# Patient Record
Sex: FEMALE | Race: BLACK OR AFRICAN AMERICAN | HISPANIC OR LATINO | Employment: FULL TIME | URBAN - METROPOLITAN AREA
[De-identification: names, ages, dates, MRNs, and addresses within clinical notes are randomized per-mention and may not be internally consistent; named-entity substitution may affect disease eponyms.]

---

## 2021-09-30 ENCOUNTER — OFFICE VISIT (OUTPATIENT)
Dept: FAMILY MEDICINE CLINIC | Facility: OTHER | Age: 36
End: 2021-09-30
Payer: COMMERCIAL

## 2021-09-30 VITALS
HEART RATE: 85 BPM | TEMPERATURE: 98 F | BODY MASS INDEX: 35.29 KG/M2 | DIASTOLIC BLOOD PRESSURE: 78 MMHG | SYSTOLIC BLOOD PRESSURE: 112 MMHG | HEIGHT: 66 IN | RESPIRATION RATE: 18 BRPM | WEIGHT: 219.6 LBS | OXYGEN SATURATION: 98 %

## 2021-09-30 DIAGNOSIS — Z11.4 SCREENING FOR HIV (HUMAN IMMUNODEFICIENCY VIRUS): ICD-10-CM

## 2021-09-30 DIAGNOSIS — Z11.59 NEED FOR HEPATITIS C SCREENING TEST: ICD-10-CM

## 2021-09-30 DIAGNOSIS — Z13.29 SCREENING FOR THYROID DISORDER: ICD-10-CM

## 2021-09-30 DIAGNOSIS — Z13.220 SCREENING FOR LIPID DISORDERS: ICD-10-CM

## 2021-09-30 PROCEDURE — 99203 OFFICE O/P NEW LOW 30 MIN: CPT | Performed by: FAMILY MEDICINE

## 2021-09-30 NOTE — PROGRESS NOTES
Assessment/Plan:    Patient presents to establish care  Discussed breast cancer screening at length as she has a sister that was diagnosed in her early 35s  No current complaints or signs/symptoms noted today  Reviewed BMI >30 and discussed importance of lifestyle modifications given increased risk for developing chronic conditions including HTN, Type DM II and HLD  RTO in 4 weeks for Papsmear  Diagnoses and all orders for this visit:    BMI 35 0-35 9,adult  -     CBC and differential; Future  -     Comprehensive metabolic panel; Future  -     Lipid panel; Future  -     Hemoglobin A1C; Future  -     Hepatitis C antibody; Future    Screening for thyroid disorder  -     T4, free; Future  -     TSH, 3rd generation; Future    Screening for lipid disorders  -     Lipid panel; Future    Screening for HIV (human immunodeficiency virus)  -     HIV 1/2 Antigen/Antibody (4th Generation) w Reflex [facility]; Future    Need for hepatitis C screening test  -     Hepatitis C antibody; Future      Nutrition Assessment and Intervention:     Ordered nutritional assessment labs      Physical Activity Assessment and Intervention:    Activity journal reviewed    Physical Activity Prescription completed with patient      Other interventions: Patient has agreed to start exercising 1 hour/week ( ie going on a walk) with partner     Tobacco and Toxic Substance Assessment and Intervention:     Tobacco use screening performed    Alcohol and drug use screening performed      Therapeutic Lifestyle Change Visit:     One-on-one comprehensive counseling, coaching, and health behavior change visit completed        Subjective:      Patient ID: Vanessa Eid is a 43 y o  female        Patient presents to establish care at this time  She would like to discuss breast cancer screening as her sister was diagnosed with breast cancer in her early 35s about 5-6 years ago  Patient reports she is unsure if sister did BRCA 1 and 2 testing, and she did call sister during visit to clarify  However, the sister does not recall this testing being done  Patient also reports that due to above family hx she got mammography done at the age of 25 in [facility] which was unremarkable for malignancy  Discussed with patient at length regarding Breast Cancer screening recommendations and it would be appropriate to beginning screening at the age of 34 given her family hx and preference for more aggressive screening  All questions and concerns answered at this time        BMI Counseling: Body mass index is 35 44 kg/m²  The BMI is above normal  Nutrition recommendations include 3-5 servings of fruits/vegetables daily, consuming healthier snacks and decreasing soda and/or juice intake  Exercise recommendations include moderate aerobic physical activity for 150 minutes/week and exercising 3-5 times per week  The following portions of the patient's history were reviewed and updated as appropriate: allergies, current medications, past family history, past medical history, past social history, past surgical history and problem list     Review of Systems   Constitutional: Negative for chills and fever  HENT: Negative for hearing loss  Eyes: Negative for visual disturbance  Respiratory: Negative for chest tightness and shortness of breath  Cardiovascular: Negative for chest pain and palpitations  Gastrointestinal: Negative for nausea and vomiting  Genitourinary: Negative for difficulty urinating and dysuria  Musculoskeletal: Negative for gait problem  Skin: Negative for rash  Neurological: Negative for dizziness, syncope, light-headedness and headaches  Objective:      /78   Pulse 85   Temp 98 °F (36 7 °C)   Resp 18   Ht 5' 6" (1 676 m)   Wt 99 6 kg (219 lb 9 6 oz)   SpO2 98%   BMI 35 44 kg/m²          Physical Exam  Vitals reviewed  Constitutional:       Appearance: She is well-developed  HENT:      Head: Normocephalic and atraumatic  Nose: Nose normal    Eyes:      Conjunctiva/sclera: Conjunctivae normal    Cardiovascular:      Rate and Rhythm: Normal rate and regular rhythm  Heart sounds: Normal heart sounds  Pulmonary:      Effort: Pulmonary effort is normal       Breath sounds: Normal breath sounds  Abdominal:      General: Bowel sounds are normal       Palpations: Abdomen is soft  Musculoskeletal:         General: Normal range of motion  Cervical back: Normal range of motion and neck supple  Skin:     General: Skin is warm and dry  Neurological:      Mental Status: She is alert and oriented to person, place, and time

## 2021-11-08 ENCOUNTER — APPOINTMENT (OUTPATIENT)
Dept: LAB | Facility: CLINIC | Age: 36
End: 2021-11-08
Payer: COMMERCIAL

## 2021-11-08 DIAGNOSIS — Z13.29 SCREENING FOR THYROID DISORDER: ICD-10-CM

## 2021-11-08 DIAGNOSIS — Z13.220 SCREENING FOR LIPID DISORDERS: ICD-10-CM

## 2021-11-08 DIAGNOSIS — Z11.4 SCREENING FOR HIV (HUMAN IMMUNODEFICIENCY VIRUS): ICD-10-CM

## 2021-11-08 DIAGNOSIS — Z11.59 NEED FOR HEPATITIS C SCREENING TEST: ICD-10-CM

## 2021-11-08 LAB
ALBUMIN SERPL BCP-MCNC: 4.2 G/DL (ref 3.5–5)
ALP SERPL-CCNC: 56 U/L (ref 46–116)
ALT SERPL W P-5'-P-CCNC: 29 U/L (ref 12–78)
ANION GAP SERPL CALCULATED.3IONS-SCNC: 11 MMOL/L (ref 4–13)
AST SERPL W P-5'-P-CCNC: 17 U/L (ref 5–45)
BASOPHILS # BLD AUTO: 0.03 THOUSANDS/ΜL (ref 0–0.1)
BASOPHILS NFR BLD AUTO: 0 % (ref 0–1)
BILIRUB SERPL-MCNC: 0.42 MG/DL (ref 0.2–1)
BUN SERPL-MCNC: 13 MG/DL (ref 5–25)
CALCIUM SERPL-MCNC: 8.9 MG/DL (ref 8.3–10.1)
CHLORIDE SERPL-SCNC: 103 MMOL/L (ref 100–108)
CHOLEST SERPL-MCNC: 165 MG/DL (ref 50–200)
CO2 SERPL-SCNC: 26 MMOL/L (ref 21–32)
CREAT SERPL-MCNC: 0.85 MG/DL (ref 0.6–1.3)
EOSINOPHIL # BLD AUTO: 0.09 THOUSAND/ΜL (ref 0–0.61)
EOSINOPHIL NFR BLD AUTO: 1 % (ref 0–6)
ERYTHROCYTE [DISTWIDTH] IN BLOOD BY AUTOMATED COUNT: 13.7 % (ref 11.6–15.1)
EST. AVERAGE GLUCOSE BLD GHB EST-MCNC: 105 MG/DL
GFR SERPL CREATININE-BSD FRML MDRD: 101 ML/MIN/1.73SQ M
GLUCOSE P FAST SERPL-MCNC: 97 MG/DL (ref 65–99)
HBA1C MFR BLD: 5.3 %
HCT VFR BLD AUTO: 39 % (ref 34.8–46.1)
HCV AB SER QL: NORMAL
HDLC SERPL-MCNC: 47 MG/DL
HGB BLD-MCNC: 12.7 G/DL (ref 11.5–15.4)
IMM GRANULOCYTES # BLD AUTO: 0.03 THOUSAND/UL (ref 0–0.2)
IMM GRANULOCYTES NFR BLD AUTO: 0 % (ref 0–2)
LDLC SERPL CALC-MCNC: 107 MG/DL (ref 0–100)
LYMPHOCYTES # BLD AUTO: 2.18 THOUSANDS/ΜL (ref 0.6–4.47)
LYMPHOCYTES NFR BLD AUTO: 28 % (ref 14–44)
MCH RBC QN AUTO: 25 PG (ref 26.8–34.3)
MCHC RBC AUTO-ENTMCNC: 32.6 G/DL (ref 31.4–37.4)
MCV RBC AUTO: 77 FL (ref 82–98)
MONOCYTES # BLD AUTO: 0.58 THOUSAND/ΜL (ref 0.17–1.22)
MONOCYTES NFR BLD AUTO: 7 % (ref 4–12)
NEUTROPHILS # BLD AUTO: 5.01 THOUSANDS/ΜL (ref 1.85–7.62)
NEUTS SEG NFR BLD AUTO: 64 % (ref 43–75)
NONHDLC SERPL-MCNC: 118 MG/DL
NRBC BLD AUTO-RTO: 0 /100 WBCS
PLATELET # BLD AUTO: 333 THOUSANDS/UL (ref 149–390)
PMV BLD AUTO: 11.1 FL (ref 8.9–12.7)
POTASSIUM SERPL-SCNC: 3.3 MMOL/L (ref 3.5–5.3)
PROT SERPL-MCNC: 8 G/DL (ref 6.4–8.2)
RBC # BLD AUTO: 5.08 MILLION/UL (ref 3.81–5.12)
SODIUM SERPL-SCNC: 140 MMOL/L (ref 136–145)
T4 FREE SERPL-MCNC: 1.17 NG/DL (ref 0.76–1.46)
TRIGL SERPL-MCNC: 57 MG/DL
TSH SERPL DL<=0.05 MIU/L-ACNC: 4.44 UIU/ML (ref 0.36–3.74)
WBC # BLD AUTO: 7.92 THOUSAND/UL (ref 4.31–10.16)

## 2021-11-08 PROCEDURE — 36415 COLL VENOUS BLD VENIPUNCTURE: CPT

## 2021-11-08 PROCEDURE — 83036 HEMOGLOBIN GLYCOSYLATED A1C: CPT

## 2021-11-08 PROCEDURE — 86803 HEPATITIS C AB TEST: CPT

## 2021-11-08 PROCEDURE — 80061 LIPID PANEL: CPT

## 2021-11-08 PROCEDURE — 85025 COMPLETE CBC W/AUTO DIFF WBC: CPT

## 2021-11-08 PROCEDURE — 87389 HIV-1 AG W/HIV-1&-2 AB AG IA: CPT

## 2021-11-08 PROCEDURE — 84443 ASSAY THYROID STIM HORMONE: CPT

## 2021-11-08 PROCEDURE — 84439 ASSAY OF FREE THYROXINE: CPT

## 2021-11-08 PROCEDURE — 80053 COMPREHEN METABOLIC PANEL: CPT

## 2021-11-09 LAB — HIV 1+2 AB+HIV1 P24 AG SERPL QL IA: NORMAL

## 2021-11-11 ENCOUNTER — ANNUAL EXAM (OUTPATIENT)
Dept: FAMILY MEDICINE CLINIC | Facility: OTHER | Age: 36
End: 2021-11-11
Payer: COMMERCIAL

## 2021-11-11 VITALS
OXYGEN SATURATION: 98 % | RESPIRATION RATE: 18 BRPM | HEIGHT: 66 IN | HEART RATE: 102 BPM | DIASTOLIC BLOOD PRESSURE: 78 MMHG | BODY MASS INDEX: 35.81 KG/M2 | TEMPERATURE: 97.8 F | WEIGHT: 222.8 LBS | SYSTOLIC BLOOD PRESSURE: 110 MMHG

## 2021-11-11 DIAGNOSIS — N63.20 LEFT BREAST LUMP: ICD-10-CM

## 2021-11-11 DIAGNOSIS — R79.89 ELEVATED TSH: ICD-10-CM

## 2021-11-11 DIAGNOSIS — Z12.4 CERVICAL CANCER SCREENING: Primary | ICD-10-CM

## 2021-11-11 PROCEDURE — G0145 SCR C/V CYTO,THINLAYER,RESCR: HCPCS | Performed by: FAMILY MEDICINE

## 2021-11-11 PROCEDURE — S0612 ANNUAL GYNECOLOGICAL EXAMINA: HCPCS | Performed by: FAMILY MEDICINE

## 2021-11-11 PROCEDURE — 3008F BODY MASS INDEX DOCD: CPT | Performed by: FAMILY MEDICINE

## 2021-11-11 PROCEDURE — 1036F TOBACCO NON-USER: CPT | Performed by: FAMILY MEDICINE

## 2021-11-11 PROCEDURE — 99213 OFFICE O/P EST LOW 20 MIN: CPT | Performed by: FAMILY MEDICINE

## 2021-11-18 LAB
LAB AP GYN PRIMARY INTERPRETATION: NORMAL
Lab: NORMAL

## 2021-11-23 ENCOUNTER — TELEPHONE (OUTPATIENT)
Dept: FAMILY MEDICINE CLINIC | Facility: OTHER | Age: 36
End: 2021-11-23

## 2022-02-04 ENCOUNTER — HOSPITAL ENCOUNTER (OUTPATIENT)
Dept: MAMMOGRAPHY | Facility: CLINIC | Age: 37
Discharge: HOME | End: 2022-02-04
Payer: COMMERCIAL

## 2022-02-04 ENCOUNTER — HOSPITAL ENCOUNTER (OUTPATIENT)
Dept: ULTRASOUND IMAGING | Facility: CLINIC | Age: 37
Discharge: HOME | End: 2022-02-04
Payer: COMMERCIAL

## 2022-02-04 VITALS — BODY MASS INDEX: 35.68 KG/M2 | HEIGHT: 66 IN | WEIGHT: 222 LBS

## 2022-02-04 DIAGNOSIS — N63.24 BREAST LUMP ON LEFT SIDE AT 8 O'CLOCK POSITION: ICD-10-CM

## 2022-02-04 PROCEDURE — 77066 DX MAMMO INCL CAD BI: CPT

## 2022-02-04 PROCEDURE — G0279 TOMOSYNTHESIS, MAMMO: HCPCS

## 2022-02-04 PROCEDURE — 76642 ULTRASOUND BREAST LIMITED: CPT

## 2023-08-24 ENCOUNTER — OFFICE VISIT (OUTPATIENT)
Dept: FAMILY MEDICINE CLINIC | Facility: OTHER | Age: 38
End: 2023-08-24
Payer: COMMERCIAL

## 2023-08-24 VITALS
TEMPERATURE: 98.2 F | HEART RATE: 74 BPM | WEIGHT: 218 LBS | BODY MASS INDEX: 35.03 KG/M2 | HEIGHT: 66 IN | RESPIRATION RATE: 13 BRPM | OXYGEN SATURATION: 98 % | SYSTOLIC BLOOD PRESSURE: 112 MMHG | DIASTOLIC BLOOD PRESSURE: 72 MMHG

## 2023-08-24 DIAGNOSIS — Z12.31 ENCOUNTER FOR SCREENING MAMMOGRAM FOR MALIGNANT NEOPLASM OF BREAST: ICD-10-CM

## 2023-08-24 DIAGNOSIS — Z13.220 SCREENING FOR LIPID DISORDERS: ICD-10-CM

## 2023-08-24 DIAGNOSIS — Z13.1 SCREENING FOR DIABETES MELLITUS: ICD-10-CM

## 2023-08-24 DIAGNOSIS — Z00.00 ANNUAL PHYSICAL EXAM: Primary | ICD-10-CM

## 2023-08-24 PROCEDURE — 99396 PREV VISIT EST AGE 40-64: CPT

## 2023-08-24 NOTE — PROGRESS NOTES
ADULT ANNUAL [facility]    NAME: Cande Montiel  AGE: 34 y.o. SEX: female  : 1985     DATE: 2023     Assessment and Plan:     Problem List Items Addressed This Visit    None  Visit Diagnoses     Annual physical exam    -  Primary    Encounter for screening mammogram for malignant neoplasm of breast        Relevant Orders    Mammo screening bilateral w 3d & cad    Screening for diabetes mellitus        Relevant Orders    Comprehensive metabolic panel    Screening for lipid disorders        Relevant Orders    Lipid Panel with Direct LDL reflex    BMI 35.0-35.9,adult              Immunizations and preventive care screenings were discussed with patient today. Appropriate education was printed on patient's after visit summary. Counseling:  Alcohol/drug use: discussed moderation in alcohol intake, the recommendations for healthy alcohol use, and avoidance of illicit drug use. Dental Health: discussed importance of regular tooth brushing, flossing, and dental visits. Injury prevention: discussed safety/seat belts, safety helmets, smoke detectors, carbon dioxide detectors, and smoking near bedding or upholstery. Sexual health: discussed sexually transmitted diseases, partner selection, use of condoms, avoidance of unintended pregnancy, and contraceptive alternatives. · Exercise: the importance of regular exercise/physical activity was discussed. Recommend exercise 3-5 times per week for at least 30 minutes. BMI Counseling: Body mass index is 35.19 kg/m². The BMI is above normal. Nutrition recommendations include encouraging healthy choices of fruits and vegetables, moderation in carbohydrate intake, increasing intake of lean protein and reducing intake of saturated and trans fat. Exercise recommendations include exercising 3-5 times per week and strength training exercises.  Rationale for BMI follow-up plan is due to patient being overweight or obese. The patient indicates understanding of these issues and agrees with the plan. Return in about 3 months (around 11/24/2023) for recehck foot discomfort. Chief Complaint:     Chief Complaint   Patient presents with   • Physical Exam     mammo      History of Present Illness:     Adult Annual Physical   Patient here for a comprehensive physical exam. The patient reports no problems. She declines  today. Diet and Physical Activity  · Diet/Nutrition: adequate fiber intake, adequate whole grain intake and proteins chicken, decreased red meat consumption. · Exercise: walking and 3-4 times a week on average. Depression Screening  General Health  · Sleep: sleeps well, gets 7-8 hours of sleep on average, witnessed apnea and witnessed gasping. · Hearing: normal - bilateral.  · Vision: goes for regular eye exams and wears glasses. · Dental: brushes teeth twice daily and planning to schedule visit with her dentist.       [address]  · Patient is: premenopausal  · Last menstrual period: 8/17/2023, regular  · Contraceptive method: none. Review of Systems:     Review of Systems   Constitutional: Negative for chills, fatigue, fever and unexpected weight change. HENT: Negative for congestion, sinus pressure, sinus pain and sore throat. Eyes: Negative for photophobia, pain, redness and visual disturbance. Respiratory: Negative for cough, chest tightness, shortness of breath and wheezing. Cardiovascular: Negative for chest pain, palpitations and leg swelling. Gastrointestinal: Negative for abdominal pain, constipation (since taking a fiber supplement has gone back to her regular of 2-3 BM daily) and nausea. Endocrine: Negative for cold intolerance, heat intolerance, polydipsia and polyuria. Genitourinary: Negative for dyspareunia, pelvic pain, vaginal bleeding, vaginal discharge and vaginal pain.    Musculoskeletal: Positive for back pain and myalgias (feet after working all day in [address] and sometimes low right back/bottom). Negative for neck pain and neck stiffness. Skin: Negative for color change, pallor, rash and wound. Neurological: Negative for tremors, weakness, light-headedness and headaches. Psychiatric/Behavioral: Negative for dysphoric mood, self-injury and sleep disturbance. The patient is not nervous/anxious. Past Medical History:     History reviewed. No pertinent past medical history.    Past Surgical History:     Past Surgical History:   Procedure Laterality Date   • TONSILLECTOMY        Social History:     Social History     Socioeconomic History   • Marital status: /Civil Union     Spouse name: None   • Number of children: None   • Years of education: None   • Highest education level: None   Occupational History   • None   Tobacco Use   • Smoking status: Never   • Smokeless tobacco: Never   Vaping Use   • Vaping Use: Never used   Substance and Sexual Activity   • Alcohol use: Yes     Comment: social   • Drug use: Never   • Sexual activity: None   Other Topics Concern   • None   Social History Narrative   • None     Social Determinants of Health     Financial Resource Strain: Not on file   Food Insecurity: Not on file   Transportation Needs: Not on file   Physical Activity: Not on file   Stress: Not on file   Social Connections: Not on file   Intimate Partner Violence: Not on file   Housing Stability: Not on file      Family History:     Family History   Problem Relation Age of Onset   • Macular degeneration Mother    • Hypertension Mother    • Macular degeneration Father    • Diabetes type II Father    • Hypertension Father    • Hypertension Sister    • Breast cancer Sister    • No Known Problems Maternal Grandmother    • No Known Problems Maternal Grandfather    • No Known Problems Paternal Grandmother    • No Known Problems Paternal Grandfather    • No Known Problems Sister    • No Known Problems Maternal Aunt    • Thyroid cancer Maternal Aunt    • No Known Problems Maternal Aunt    • No Known Problems Maternal Aunt    • No Known Problems Maternal Aunt       Current Medications:     No current outpatient medications on file. No current facility-administered medications for this visit. Allergies:     No Known Allergies   Physical Exam:     /72   Pulse 74   Temp 98.2 °F (36.8 °C)   Resp 13   Ht 5' 6" (1.676 m)   Wt 98.9 kg (218 lb)   SpO2 98%   BMI 35.19 kg/m²     Physical Exam  Vitals and nursing note reviewed. Constitutional:       General: She is not in acute distress. Appearance: She is well-developed. She is not diaphoretic. Comments: Body mass index is 35.19 kg/m². HENT:      Head: Normocephalic and atraumatic. Right Ear: External ear normal.      Left Ear: External ear normal.      Nose: No congestion. Mouth/Throat:      Mouth: Mucous membranes are moist.      Pharynx: Oropharynx is clear. Eyes:      Extraocular Movements: Extraocular movements intact. Conjunctiva/sclera: Conjunctivae normal.      Pupils: Pupils are equal, round, and reactive to light. Cardiovascular:      Rate and Rhythm: Normal rate and regular rhythm. Pulses: Normal pulses. Heart sounds: Normal heart sounds. No murmur heard. Pulmonary:      Effort: Pulmonary effort is normal. No respiratory distress. Breath sounds: Normal breath sounds. Abdominal:      General: Bowel sounds are normal.      Palpations: Abdomen is soft. Tenderness: There is no abdominal tenderness. Musculoskeletal:         General: No swelling. Cervical back: Neck supple. Lymphadenopathy:      Cervical: No cervical adenopathy. Skin:     General: Skin is warm and dry. Capillary Refill: Capillary refill takes less than 2 seconds. Neurological:      Mental Status: She is alert and oriented to person, place, and time. Cranial Nerves: No cranial nerve deficit.    Psychiatric:         Mood and Affect: Mood normal.          Susi Schuster DO  [address] [address]

## 2023-10-05 ENCOUNTER — HOSPITAL ENCOUNTER (OUTPATIENT)
Dept: MAMMOGRAPHY | Facility: HOSPITAL | Age: 38
Discharge: HOME | End: 2023-10-05
Payer: COMMERCIAL

## 2023-10-05 VITALS — BODY MASS INDEX: 33.11 KG/M2 | WEIGHT: 206 LBS | HEIGHT: 66 IN

## 2023-10-05 DIAGNOSIS — Z12.31 ENCOUNTER FOR SCREENING MAMMOGRAM FOR MALIGNANT NEOPLASM OF BREAST: ICD-10-CM

## 2023-10-05 PROCEDURE — 77063 BREAST TOMOSYNTHESIS BI: CPT

## 2023-10-05 PROCEDURE — 77067 SCR MAMMO BI INCL CAD: CPT

## 2024-07-02 ENCOUNTER — HOSPITAL ENCOUNTER (OUTPATIENT)
Dept: RADIOLOGY | Facility: HOSPITAL | Age: 39
Discharge: HOME | End: 2024-07-02
Payer: COMMERCIAL

## 2024-07-02 ENCOUNTER — LAB (OUTPATIENT)
Dept: LAB | Facility: CLINIC | Age: 39
End: 2024-07-02
Payer: COMMERCIAL

## 2024-07-02 ENCOUNTER — OFFICE VISIT (OUTPATIENT)
Age: 39
End: 2024-07-02
Payer: COMMERCIAL

## 2024-07-02 VITALS
BODY MASS INDEX: 35.62 KG/M2 | SYSTOLIC BLOOD PRESSURE: 120 MMHG | HEART RATE: 81 BPM | DIASTOLIC BLOOD PRESSURE: 72 MMHG | HEIGHT: 66 IN | OXYGEN SATURATION: 99 % | TEMPERATURE: 97.5 F | WEIGHT: 221.6 LBS

## 2024-07-02 DIAGNOSIS — Z13.1 SCREENING FOR DIABETES MELLITUS: ICD-10-CM

## 2024-07-02 DIAGNOSIS — M25.562 ACUTE PAIN OF LEFT KNEE: ICD-10-CM

## 2024-07-02 DIAGNOSIS — M25.462 SWELLING OF LEFT KNEE JOINT: ICD-10-CM

## 2024-07-02 DIAGNOSIS — M25.562 ACUTE PAIN OF LEFT KNEE: Primary | ICD-10-CM

## 2024-07-02 DIAGNOSIS — Z13.220 SCREENING FOR LIPID DISORDERS: ICD-10-CM

## 2024-07-02 LAB
ALBUMIN SERPL BCG-MCNC: 4.3 G/DL (ref 3.5–5)
ALP SERPL-CCNC: 50 U/L (ref 34–104)
ALT SERPL W P-5'-P-CCNC: 14 U/L (ref 7–52)
ANION GAP SERPL CALCULATED.3IONS-SCNC: 6 MMOL/L (ref 4–13)
AST SERPL W P-5'-P-CCNC: 13 U/L (ref 13–39)
BILIRUB SERPL-MCNC: 0.55 MG/DL (ref 0.2–1)
BUN SERPL-MCNC: 19 MG/DL (ref 5–25)
CALCIUM SERPL-MCNC: 8.8 MG/DL (ref 8.4–10.2)
CHLORIDE SERPL-SCNC: 105 MMOL/L (ref 96–108)
CHOLEST SERPL-MCNC: 157 MG/DL
CO2 SERPL-SCNC: 27 MMOL/L (ref 21–32)
CREAT SERPL-MCNC: 0.83 MG/DL (ref 0.6–1.3)
GFR SERPL CREATININE-BSD FRML MDRD: 87 ML/MIN/1.73SQ M
GLUCOSE P FAST SERPL-MCNC: 104 MG/DL (ref 65–99)
HDLC SERPL-MCNC: 42 MG/DL
LDLC SERPL CALC-MCNC: 100 MG/DL (ref 0–100)
POTASSIUM SERPL-SCNC: 4 MMOL/L (ref 3.5–5.3)
PROT SERPL-MCNC: 7.4 G/DL (ref 6.4–8.4)
SODIUM SERPL-SCNC: 138 MMOL/L (ref 135–147)
TRIGL SERPL-MCNC: 76 MG/DL

## 2024-07-02 PROCEDURE — 73560 X-RAY EXAM OF KNEE 1 OR 2: CPT

## 2024-07-02 PROCEDURE — 36415 COLL VENOUS BLD VENIPUNCTURE: CPT

## 2024-07-02 PROCEDURE — 80053 COMPREHEN METABOLIC PANEL: CPT

## 2024-07-02 PROCEDURE — 80061 LIPID PANEL: CPT

## 2024-07-02 PROCEDURE — 99213 OFFICE O/P EST LOW 20 MIN: CPT

## 2024-07-02 RX ORDER — NAPROXEN 250 MG/1
250 TABLET ORAL 2 TIMES DAILY WITH MEALS
Qty: 20 TABLET | Refills: 0 | Status: SHIPPED | OUTPATIENT
Start: 2024-07-02 | End: 2024-07-12

## 2024-07-02 NOTE — PROGRESS NOTES
Ambulatory Visit  Name: Cande Montiel      : 1985      MRN: [id number]  Encounter Provider: Rick Knox MD  Encounter Date: 2024   Encounter department: [facility]    Assessment & Plan   Patient is a 39-year-old female with no relevant PMH who presents with chief complaint of left knee pain.  Patient stated pain started approximately 1 month ago, no known inciting factor.  Pain waxes and wanes, at worst is rated 8 out of 10 by patient.  Pain is accompanied by significant swelling of the left knee, as well as pain on kneeling and occasional buckling of the knee while walking.  Physical exam demonstrates swelling at the lateral aspect of the patella as well as pain on patellar ballottement.  Anterior and posterior drawer negative.  Dg's positive for pain on lateral aspect of left knee.  Patient to be trialed on conservative management at this point in time of oral and topical NSAIDs with x-ray of knee.  Patient referred to orthopedics for further imaging and possible evaluation of meniscal injury.  Return to clinic in 1 month, physical therapy orders pending normal x-ray finding.    1. Acute pain of left knee  -     XR knee 1 or 2 vw left; Future; Expected date: 2024  -     naproxen (NAPROSYN) 250 mg tablet; Take 1 tablet (250 mg total) by mouth 2 (two) times a day with meals for 10 days  -     Diclofenac Sodium (VOLTAREN) 1 %; Apply 2 g topically 4 (four) times a day For pain to affected area  -     Ambulatory Referral to Orthopedic Surgery; Future  2. Swelling of left knee joint  -     Ambulatory Referral to Orthopedic Surgery; Future         History of Present Illness     Knee occasionally swells, causes significant pain   Restricted ROM    Knee Pain   The incident occurred more than 1 week ago. The incident occurred at work. There was no injury mechanism. The pain is present in the left knee. The pain is at a severity of 8/10. The pain is moderate. The pain  has been Fluctuating since onset. Associated symptoms include an inability to bear weight and muscle weakness. She reports no foreign bodies present. The symptoms are aggravated by movement. The treatment provided mild relief.     Review of Systems   Constitutional:  Negative for chills and fever.   HENT:  Negative for ear pain and sore throat.    Eyes:  Negative for pain and visual disturbance.   Respiratory:  Negative for cough and shortness of breath.    Cardiovascular:  Negative for chest pain and palpitations.   Gastrointestinal:  Negative for abdominal pain and vomiting.   Genitourinary:  Negative for dysuria and hematuria.   Musculoskeletal:  Positive for arthralgias. Negative for back pain and myalgias.   Skin:  Negative for color change and rash.   Neurological:  Negative for seizures and syncope.   All other systems reviewed and are negative.  History reviewed. No pertinent past medical history.  Past Surgical History:   Procedure Laterality Date   • TONSILLECTOMY       Family History   Problem Relation Age of Onset   • Macular degeneration Mother    • Hypertension Mother    • Macular degeneration Father    • Diabetes type II Father    • Hypertension Father    • Hypertension Sister    • Breast cancer Sister 46   • No Known Problems Sister    • No Known Problems Maternal Grandmother    • No Known Problems Maternal Grandfather    • No Known Problems Paternal Grandmother    • No Known Problems Paternal Grandfather    • No Known Problems Maternal Aunt    • Thyroid cancer Maternal Aunt    • No Known Problems Maternal Aunt    • No Known Problems Maternal Aunt    • No Known Problems Maternal Aunt      Social History     Tobacco Use   • Smoking status: Never   • Smokeless tobacco: Never   Vaping Use   • Vaping status: Never Used   Substance and Sexual Activity   • Alcohol use: Yes     Comment: social   • Drug use: Never   • Sexual activity: Not on file     No current outpatient medications on file prior to visit.  "    No Known Allergies  Immunization History   Administered Date(s) Administered   • COVID-19 PFIZER VACCINE 0.3 ML IM 05/30/2021, 06/20/2021     Objective     /72   Pulse 81   Temp 97.5 °F (36.4 °C)   Ht 5' 6\" (1.676 m)   Wt 101 kg (221 lb 9.6 oz)   SpO2 99%   BMI 35.77 kg/m²     Physical Exam  Vitals and nursing note reviewed.   Constitutional:       General: She is not in acute distress.     Appearance: She is well-developed.   HENT:      Head: Normocephalic and atraumatic.   Eyes:      Conjunctiva/sclera: Conjunctivae normal.   Cardiovascular:      Rate and Rhythm: Normal rate and regular rhythm.      Heart sounds: No murmur heard.  Pulmonary:      Effort: Pulmonary effort is normal. No respiratory distress.      Breath sounds: Normal breath sounds.   Abdominal:      Palpations: Abdomen is soft.      Tenderness: There is no abdominal tenderness.   Musculoskeletal:         General: Swelling and tenderness present.      Cervical back: Neck supple.      Comments: Swelling of left knee  Dg's maneuver eliciting significant pain   Skin:     General: Skin is warm and dry.      Capillary Refill: Capillary refill takes less than 2 seconds.   Neurological:      Mental Status: She is alert.   Psychiatric:         Mood and Affect: Mood normal.   Administrative Statements         "

## 2024-07-03 NOTE — RESULT ENCOUNTER NOTE
Labs stable -- plan to review at patient's next visit with Dr Knox in August '24.    Jossy Hallman, DO

## 2024-07-11 ENCOUNTER — APPOINTMENT (OUTPATIENT)
Dept: RADIOLOGY | Facility: AMBULARY SURGERY CENTER | Age: 39
End: 2024-07-11
Attending: ORTHOPAEDIC SURGERY
Payer: COMMERCIAL

## 2024-07-11 ENCOUNTER — OFFICE VISIT (OUTPATIENT)
Dept: OBGYN CLINIC | Facility: CLINIC | Age: 39
End: 2024-07-11
Payer: COMMERCIAL

## 2024-07-11 VITALS
WEIGHT: 226 LBS | HEART RATE: 76 BPM | HEIGHT: 66 IN | SYSTOLIC BLOOD PRESSURE: 129 MMHG | BODY MASS INDEX: 36.32 KG/M2 | DIASTOLIC BLOOD PRESSURE: 85 MMHG

## 2024-07-11 DIAGNOSIS — S83.242A OTHER TEAR OF MEDIAL MENISCUS OF LEFT KNEE AS CURRENT INJURY, INITIAL ENCOUNTER: ICD-10-CM

## 2024-07-11 DIAGNOSIS — M22.2X2 PATELLOFEMORAL DISORDER OF LEFT KNEE: ICD-10-CM

## 2024-07-11 DIAGNOSIS — M25.562 ACUTE PAIN OF LEFT KNEE: ICD-10-CM

## 2024-07-11 DIAGNOSIS — M25.562 LEFT KNEE PAIN, UNSPECIFIED CHRONICITY: ICD-10-CM

## 2024-07-11 DIAGNOSIS — M25.462 SWELLING OF LEFT KNEE JOINT: ICD-10-CM

## 2024-07-11 DIAGNOSIS — M25.562 LEFT KNEE PAIN, UNSPECIFIED CHRONICITY: Primary | ICD-10-CM

## 2024-07-11 PROCEDURE — 73560 X-RAY EXAM OF KNEE 1 OR 2: CPT

## 2024-07-11 PROCEDURE — 99204 OFFICE O/P NEW MOD 45 MIN: CPT | Performed by: ORTHOPAEDIC SURGERY

## 2024-07-11 PROCEDURE — 20610 DRAIN/INJ JOINT/BURSA W/O US: CPT | Performed by: ORTHOPAEDIC SURGERY

## 2024-07-11 RX ORDER — METHYLPREDNISOLONE 4 MG/1
TABLET ORAL
Qty: 21 EACH | Refills: 0 | Status: SHIPPED | OUTPATIENT
Start: 2024-07-11

## 2024-07-11 RX ORDER — BUPIVACAINE HYDROCHLORIDE 2.5 MG/ML
2 INJECTION, SOLUTION INFILTRATION; PERINEURAL
Status: COMPLETED | OUTPATIENT
Start: 2024-07-11 | End: 2024-07-11

## 2024-07-11 RX ORDER — TRIAMCINOLONE ACETONIDE 40 MG/ML
80 INJECTION, SUSPENSION INTRA-ARTICULAR; INTRAMUSCULAR
Status: COMPLETED | OUTPATIENT
Start: 2024-07-11 | End: 2024-07-11

## 2024-07-11 RX ADMIN — BUPIVACAINE HYDROCHLORIDE 2 ML: 2.5 INJECTION, SOLUTION INFILTRATION; PERINEURAL at 09:30

## 2024-07-11 RX ADMIN — TRIAMCINOLONE ACETONIDE 80 MG: 40 INJECTION, SUSPENSION INTRA-ARTICULAR; INTRAMUSCULAR at 09:30

## 2024-07-11 NOTE — PATIENT INSTRUCTIONS
PATELLOFEMORAL SYNDROME-Serrano TAPING TECHNIQUE    Search “Leukotape P tape” and “Cover roll stretch tape” on  [employer]  Leukotape P is typically 1.5in x 15 yards, Cover roll stretch tape is typically 2in x 10 yards        How to apply:  Place cover roll tape over knee cap. This protects the skin.  Apply Leukotape over the cover roll tape. Use the Leukotape to pull the knee cap to the middle of the body (medial side of knee) to prevent lateral (outside) tracking of the knee cap.  Wear the tape for 3 days (72 hrs) straight, then take off one day (24 hrs) off, then repeat for a total of 6 weeks. You should be > or = 50% improved at which time you may start weaning the tape by wearing it less each day.  Visit [employer] and search “Serrano tape for the knee” to watch a video on how to apply tape.  Video titled “Serrano Taping of the knee” created by [employer] recommended.    What does Serrano taping technique do?  Patellofemoral syndrome is when the inside quadriceps muscle, called the VMO muscle, becomes weak due to a number of factors. This causes the Patellofemoral ligament, which is the only ligament holding the patella (knee cap) in place, to become weak as well. When the ligament becomes weak, the knee cap drifts or tracks too far to the lateral side of the knee (outside knee) which causes tension on this ligament. The knee cap hits the lateral area of the femur, resulting in pain or discomfort around the front of the knee.  Physical Therapy is sometimes used to strengthen the weak muscles, such as the VMO, to correct this problem. When the tape is applied correctly, it helps to realign the knee cap to the center of the knee. This helps correct for the lateral tracking of the knee cap and relieve discomfort.  You can search online for exercises that can help strengthen the VMO quadriceps muscle or attend physical therapy.

## 2024-07-11 NOTE — PROGRESS NOTES
Assessment & Plan     1. Left knee pain, unspecified chronicity    2. Acute pain of left knee    3. Swelling of left knee joint    4. Other tear of medial meniscus of left knee as current injury, initial encounter    5. Patellofemoral disorder of left knee      Orders Placed This Encounter   Procedures    Large joint arthrocentesis: L knee    XR knee 1 or 2 vw left    MRI knee left wo contrast    Ambulatory referral to Physical Therapy     39 year old female with suspected left medial meniscus tear  Diagnostics reviewed and physical exam performed.  Diagnosis, treatment options and associated risks were discussed with the patient including no treatment, nonsurgical treatment and potential for surgical intervention.  The patient was given the opportunity to ask questions regarding each.  Patient was offered, accepted, and received a cortisone injection of the left knee. Risks and benefits of CSI were discussed with the patient. The corticosteroid injection was administered without any immediate complication and was well tolerated by the patient. This was done under sterile technique. Post injection instructions and expectations were discussed. It was explained to the patient that cortisone injections can be repeated as early as every 3 months.   MRI of left knee ordered today to further evaluate for meniscal injury  Recommend Zhang taping technique to aid with patellofemoral tracking  Order placed today for Medrol Dose Gama to be taken as prescribed, if no relief from cortisone injection provided today  Referral placed today for Cande to initiate outpatient Physical Therapy  Follow up in 6 weeks or after MRI, whichever comes first, to review results and determine future treatment at that time      Return in 6 weeks (on 8/22/2024).    I answered all of the patient's questions during the visit and provided education of the patient's condition during the visit.  The patient verbalized understanding of the  information given and agrees with the plan.  This note was dictated using [employer] software.  It may contain errors including improperly dictated words.  Please contact physician directly for any questions.      Subjective:     Patient ID: Cande Montiel 39 y.o. female    HPI    HPI: Cande Montiel is a 39 y.o. female that c/o left knee pain that began approximately 1.5 months ago with no obvious mechanism of injury, trauma, or inciting event.  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 She notes intermittent, sharp stabbing 8/10 pain with associated swelling and sensation of instability.  Pain is aggravated with kneeling.  She has tried Voltaren gel, naproxen with some relief. She does not wear a brace. Denies previous injury, surgery, or trauma. Denies paraesthesias. Can no longer take stairs in an alternating fashion.       The following portions of the patient's history were reviewed and updated as appropriate: allergies, current medications, past family history, past social history, past surgical history and problem list.      Objective:    Review of Systems  Pertinent items are noted in HPI.  All other systems were reviewed and are negative.    History reviewed. No pertinent past medical history.    Past Surgical History:   Procedure Laterality Date    TONSILLECTOMY         Family History   Problem Relation Age of Onset    Macular degeneration Mother     Hypertension Mother     Macular degeneration Father     Diabetes type II Father     Hypertension Father     Hypertension Sister     Breast cancer Sister 46    No Known Problems Sister     No Known Problems Maternal Grandmother     No Known Problems Maternal Grandfather     No Known Problems Paternal Grandmother     No Known Problems Paternal Grandfather     No Known Problems Maternal Aunt     Thyroid cancer Maternal Aunt     No Known Problems Maternal Aunt     No  "Known Problems Maternal Aunt     No Known Problems Maternal Aunt        Social History     Occupational History    Not on file   Tobacco Use    Smoking status: Never    Smokeless tobacco: Never   Vaping Use    Vaping status: Never Used   Substance and Sexual Activity    Alcohol use: Yes     Comment: social    Drug use: Never    Sexual activity: Not on file         Current Outpatient Medications:     Diclofenac Sodium (VOLTAREN) 1 %, Apply 2 g topically 4 (four) times a day For pain to affected area, Disp: 100 g, Rfl: 0    naproxen (NAPROSYN) 250 mg tablet, Take 1 tablet (250 mg total) by mouth 2 (two) times a day with meals for 10 days, Disp: 20 tablet, Rfl: 0    No Known Allergies    Physical Exam  /85   Pulse 76   Ht 5' 6\" (1.676 m)   Wt 103 kg (226 lb)   BMI 36.48 kg/m²   Cons: Appears well.  No apparent distress.  Psych: Alert. Oriented x3.  Mood and affect normal.  Eyes: PERRLA, EOMI  Resp: Normal effort.  No audible wheezing or stridor.  CV: Palpable pulse.  No discernable arrhythmia.  No LE edema.  Lymph:  No palpable cervical, axillary, or inguinal lymphadenopathy.  Skin: Warm.  No palpable masses.  No visible lesions.  Neuro: Normal muscle tone.  Normal and symmetric DTR's.    Left Knee Exam     Tenderness   The patient is experiencing tenderness in the medial joint line and patella.    Range of Motion   Extension:  0   Flexion:  120     Tests   Dg:  Medial - positive Lateral - negative  Varus: negative Valgus: negative  Drawer:  Anterior - negative     Posterior - negative  Patellar apprehension: negative    Other   Erythema: absent  Scars: absent  Sensation: normal  Pulse: present  Swelling: none  Effusion: no effusion present    Comments:    Knee stable at 0, 30, 90 degrees  + Patellar grind  + peripatellar crepitation  + Thessaly for medial pain  TTP lateral patellar facet  Skin intact and well perfused  Sensation intact in DP/SP/Sawyer/Sa/T nerve distributions  2+ DP & PT pulses  Brisk " "capillary refill in all toes                Large joint arthrocentesis: L knee  Universal Protocol:  Consent: Verbal consent obtained.  Risks and benefits: risks, benefits and alternatives were discussed  Consent given by: patient  Time out: Immediately prior to procedure a \"time out\" was called to verify the correct patient, procedure, equipment, support staff and site/side marked as required.  Patient understanding: patient states understanding of the procedure being performed  Site marked: the operative site was marked  Patient identity confirmed: verbally with patient  Supporting Documentation  Indications: pain and diagnostic evaluation   Procedure Details  Location: knee - L knee  Preparation: Patient was prepped and draped in the usual sterile fashion  Needle size: 22 G  Ultrasound guidance: no  Approach: medial  Medications administered: 2 mL bupivacaine 0.25 %; 80 mg triamcinolone acetonide 40 mg/mL    Patient tolerance: patient tolerated the procedure well with no immediate complications  Dressing:  Sterile dressing applied              I have personally reviewed pertinent films in PACS and my interpretation is no acute osseous abnormalities, minimal degenerative changes.      Scribe Attestation      I,:  Palma Caal am acting as a scribe while in the presence of the attending physician.:       I,:  Juaquin Frankel DO personally performed the services described in this documentation    as scribed in my presence.:                Portions of the record may have been created with voice recognition software.  Occasional wrong word or \"sound a like\" substitutions may have occurred due to the inherent limitations of voice recognition software.  Read the chart carefully and recognize, using context, where substitutions have occurred.  "

## 2024-07-24 NOTE — PROGRESS NOTES
PT Evaluation     Today's date: 2024  Patient name: Cande Montiel  : 1985  MRN: [id number]  Referring provider: Juaquin Frankel DO  Dx:   Encounter Diagnosis     ICD-10-CM    1. Acute pain of left knee  M25.562 Ambulatory referral to Physical Therapy      2. Swelling of left knee joint  M25.462 Ambulatory referral to Physical Therapy      3. Other tear of medial meniscus of left knee as current injury, initial encounter  S83.242A Ambulatory referral to Physical Therapy      4. Patellofemoral disorder of left knee  M22.2X2 Ambulatory referral to Physical Therapy          Start Time: 1400  Stop Time: 1445  Total time in clinic (min): 45 minutes    Assessment  Impairments: abnormal gait, abnormal muscle tone, abnormal or restricted ROM, abnormal movement, activity intolerance, impaired balance, impaired physical strength, lacks appropriate home exercise program, pain with function, weight-bearing intolerance, poor posture  and poor body mechanics  Symptom irritability: moderate    Assessment details: Patient presents to PT with L knee pain limiting pt activity tolerance. Postural assessment reveals increased knee swelling contributing to patellar maltracking. Decreased thigh musculature flexibility results in impaired knee AROM in all planes. Decreased hip and VMO musculature strength results in poor unilateral stability and impaired squatting mechanics. Functional limitations include prolonged sitting, standing, walking, stair negotiation, ADLs, and work duties. Provided updated copy of HEP to aide with compliance with good understanding. Patient would benefit from skilled PT interventions to address above mentioned impairments, activity limitations, and participation restrictions to reduce risk for injury and return to prior activity tolerance.     Understanding of Dx/Px/POC: good     Prognosis: good    Goals  STG (in 3 weeks)  1. Improve knee AROM to WNL from gains in flexibility  2. <2/10 pain with  >30 min walking from gains in positional tolerance  3. Good squatting mechanics to maximize transfers  LTG (in 8 weeks)  1. SLS >30 sec to maximize balance on uneven surfaces  2. Increase knee MMT >4+/5 in all planes to improve unilateral stability  3. Increase FOTO score to goal to facilitate return to PLOF    Plan  Patient would benefit from: skilled physical therapy  Planned modality interventions: cryotherapy    Planned therapy interventions: abdominal trunk stabilization, ADL training, activity modification, balance, IASTM, joint mobilization, manual therapy, balance/weight bearing training, body mechanics training, coordination, dressing changes, flexibility, functional ROM exercises, gait training, home exercise program, neuromuscular re-education, patient education, postural training, strengthening, stretching, therapeutic activities, therapeutic exercise and transfer training    Frequency: 2x week  Duration in weeks: 8  Plan of Care beginning date: 7/25/2024  Plan of Care expiration date: 9/19/2024  Treatment plan discussed with: patient  Plan details: Thank you for referring Cande Montiel to PT at [facility] and for the opportunity to coordinate care.          Subjective  Beginning of May, patient began experiencing L knee pain with no specific REHANA; she attributes it to switching her jobs. Patient had ortho consult and underwent imaging which revealed nothing significant. Pain is described as sharp around medial and lateral knee and currently rated 4/10. Patient denies any numbness/tingling or radiating pain. Pt reports occasional buckling 1x/day and calf cramping during prolonged activities. Pain improves to 1/10 with steroids, cryotherapy and pain meds. Pain increases to 8/10 with sitting with increased knee flexion (<20 min), STS transfers, standing (<2 min), walking (<10 min), stairs (step to pattern ascending and descending) and sleeping (1 nightly disturbances/week). Patient has increased  "difficulty with ADLs such as driving, cooking, cleaning, bending down, and lifting. 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 Prior to dysfunction, patient was participating in light gym activities which she is now unable to do. Patient goals for PT are to reduce pain and return to prior activity level.        Objective      Posture: increased knee flexion, forward flexed posture  5x STS: 26 sec without UE assistance    SLS: R 30 sec L 16 sec  Gait: decreased LLE stance time, lack of knee flexion during swing phase   Palpation: +TTP patellar tendon, medial and lateral joint line, ITB insertion; increased swelling around lateral knee  ROM:   Knee flexion: R 130 deg L 0 deg   Knee extension: R 122 deg L 0 deg  MMT:   Knee flexion: R 5/5 L 4/5  Knee extension: R 4+/5 L 4-/5 painful   Special tests:   VMO tone: R WNL L good   Patellar tracking: R lateral L lateral; b/l significant hypermobility in all planes     Precautions: not significant  *HEP: 4MRYRQQR     Manuals 7/25                 [facility]                                                           Neuro Re-Ed                   Pt education Prognosis, diagnosis, HEP                 Bridges nv                 Clamshells  nv          SLS                  HS stretch 20\"x2                 Calf stretch 20\"x2          SLR Quad set 5\"x10                 Heel slides 5\"x10                  Ther Ex                   SAQ nv                 LAQ nv                 HS curls                   Standing HR  nv                                                         Ther Activity                   Squatting  nv                 Side steps                   Step ups (FW)  nv                 Step ups (LAT)                   Tib raises  nv                 Modalities                   cryotherapy                                              "

## 2024-07-25 ENCOUNTER — EVALUATION (OUTPATIENT)
Dept: PHYSICAL THERAPY | Facility: REHABILITATION | Age: 39
End: 2024-07-25
Payer: COMMERCIAL

## 2024-07-25 DIAGNOSIS — S83.242A OTHER TEAR OF MEDIAL MENISCUS OF LEFT KNEE AS CURRENT INJURY, INITIAL ENCOUNTER: ICD-10-CM

## 2024-07-25 DIAGNOSIS — M25.462 SWELLING OF LEFT KNEE JOINT: ICD-10-CM

## 2024-07-25 DIAGNOSIS — M25.562 ACUTE PAIN OF LEFT KNEE: Primary | ICD-10-CM

## 2024-07-25 DIAGNOSIS — M22.2X2 PATELLOFEMORAL DISORDER OF LEFT KNEE: ICD-10-CM

## 2024-07-25 PROCEDURE — 97161 PT EVAL LOW COMPLEX 20 MIN: CPT

## 2024-07-30 NOTE — PROGRESS NOTES
"Daily Note     Today's date: 2024  Patient name: Cande Montiel  : 1985  MRN: [id number]  Referring provider: Juaquin Frankel DO  Dx:   Encounter Diagnosis     ICD-10-CM    1. Acute pain of left knee  M25.562       2. Swelling of left knee joint  M25.462       3. Other tear of medial meniscus of left knee as current injury, initial encounter  S83.242A       4. Patellofemoral disorder of left knee  M22.2X2           Start Time: 1300  Stop Time: 1345  Total time in clinic (min): 45 minutes    Subjective: Pt reports slight soreness after HEP, otherwise feeling good. No questions on HEP.      Objective: See treatment diary below      Assessment: Tolerated treatment well. Pt tolerated quad strengthening activities with verbal cues to maintain upright posture and terminal knee extension at end range with good VMO activation noted. Pt initiated hip strengthening with verbal cues to maintain hip extension and glute activation at end range with good tolerance observed. Patient demonstrated fatigue post treatment, exhibited good technique with therapeutic exercises, and would benefit from continued PT      Plan: Continue per plan of care.      Precautions: not significant  *HEP: 4MRYRQQR     Manuals                [facility]                                                           Neuro Re-Ed                   Pt education Prognosis, diagnosis, HEP                 Bridges nv  2x10               Clamshells  nv Pink 2x10 ea         SLS                  HS stretch 20\"x2                 Calf stretch 20\"x2          SLR Quad set 5\"x10  2x10               Heel slides 5\"x10                  Ther Ex                   SAQ nv  2x10               LAQ nv  2x10               HS curls                   Standing HR  nv  3x10               Bike   5' lvl 1                                   Ther Activity                   STS  nv 1 foam 3x10                Side steps    nv               Step ups (FW)   "  nv               Step ups (LAT)                   Tib raises  nv  2x15               Modalities                   cryotherapy

## 2024-07-30 NOTE — PROGRESS NOTES
January 31, 2023      [facility]  [address], [address]  [facility] [address]  Phone: [phone]  Fax: [phone]       Patient: Michael Chavez   YOB: 2010  Date of Visit: 01/31/2023    To Whom It May Concern:    Tatiana Chavez  was at [facility] on 01/31/2023. The patient may return to work/school on 02/01/2023. If you have any questions or concerns, or if I can be of further assistance, please do not hesitate to contact me.    Sincerely,    Ruiz Meza MA      "Daily Note     Today's date: 2024  Patient name: Cande Montiel  : 1985  MRN: [id number]  Referring provider: Juaquin Frankel DO  Dx:   Encounter Diagnosis     ICD-10-CM    1. Acute pain of left knee  M25.562       2. Swelling of left knee joint  M25.462       3. Other tear of medial meniscus of left knee as current injury, initial encounter  S83.242A       4. Patellofemoral disorder of left knee  M22.2X2           Start Time: 1400  Stop Time: 1445  Total time in clinic (min): 45 minutes    Subjective: Pt reports slight soreness after yesterday's session. She states she has had less cramping around her calves.       Objective: See treatment diary below      Assessment: Tolerated treatment well. Pt increased resistance and repetitions with therapeutic exercises, instructed pt to work through full available range with no adverse effects and good tolerance observed. Pt initiated step ups with verbal cues for upright posture and increasing hip and knee flexion with good form observed. Patient demonstrated fatigue post treatment, exhibited good technique with therapeutic exercises, and would benefit from continued PT      Plan: Continue per plan of care.      Precautions: not significant  *HEP: 4MRYRQQR     Manuals              [facility]                                                           Neuro Re-Ed                   Pt education Prognosis, diagnosis, HEP                 Bridges nv  2x10 3x10             Clamshells  nv Pink 2x10 ea Pink 2x10 ea        SLS                  HS stretch 20\"x2                 Calf stretch 20\"x2          SLR Quad set 5\"x10  2x10 3x10             Heel slides 5\"x10                  Ther Ex                   SAQ nv  2x10 1# 2x10             LAQ nv  2x10 1# 2x10             HS curls                   Standing HR  nv  3x10 3x10             Bike   5' lvl 1 5' lvl 1                                 Ther Activity                   STS  nv 1 foam " "3x10   1 foam 3x10              Side steps    nv Pink 2 laps             Step ups (FW)    nv  6\" 2x10 ea             Step ups (LAT)                   Tib raises  nv  2x15 2x15              Modalities                   cryotherapy                                                "

## 2024-07-31 ENCOUNTER — OFFICE VISIT (OUTPATIENT)
Dept: PHYSICAL THERAPY | Facility: REHABILITATION | Age: 39
End: 2024-07-31
Payer: COMMERCIAL

## 2024-07-31 DIAGNOSIS — M22.2X2 PATELLOFEMORAL DISORDER OF LEFT KNEE: ICD-10-CM

## 2024-07-31 DIAGNOSIS — M25.462 SWELLING OF LEFT KNEE JOINT: ICD-10-CM

## 2024-07-31 DIAGNOSIS — M25.562 ACUTE PAIN OF LEFT KNEE: Primary | ICD-10-CM

## 2024-07-31 DIAGNOSIS — S83.242A OTHER TEAR OF MEDIAL MENISCUS OF LEFT KNEE AS CURRENT INJURY, INITIAL ENCOUNTER: ICD-10-CM

## 2024-07-31 PROCEDURE — 97112 NEUROMUSCULAR REEDUCATION: CPT

## 2024-07-31 PROCEDURE — 97530 THERAPEUTIC ACTIVITIES: CPT

## 2024-07-31 PROCEDURE — 97110 THERAPEUTIC EXERCISES: CPT

## 2024-08-01 ENCOUNTER — OFFICE VISIT (OUTPATIENT)
Dept: PHYSICAL THERAPY | Facility: REHABILITATION | Age: 39
End: 2024-08-01
Payer: COMMERCIAL

## 2024-08-01 DIAGNOSIS — S83.242A OTHER TEAR OF MEDIAL MENISCUS OF LEFT KNEE AS CURRENT INJURY, INITIAL ENCOUNTER: ICD-10-CM

## 2024-08-01 DIAGNOSIS — M22.2X2 PATELLOFEMORAL DISORDER OF LEFT KNEE: ICD-10-CM

## 2024-08-01 DIAGNOSIS — M25.562 ACUTE PAIN OF LEFT KNEE: Primary | ICD-10-CM

## 2024-08-01 DIAGNOSIS — M25.462 SWELLING OF LEFT KNEE JOINT: ICD-10-CM

## 2024-08-01 PROCEDURE — 97112 NEUROMUSCULAR REEDUCATION: CPT

## 2024-08-01 PROCEDURE — 97110 THERAPEUTIC EXERCISES: CPT

## 2024-08-01 PROCEDURE — 97530 THERAPEUTIC ACTIVITIES: CPT

## 2024-08-06 NOTE — PROGRESS NOTES
"Daily Note     Today's date: 2024  Patient name: Cande Montiel  : 1985  MRN: [id number]  Referring provider: Juaquin Frankel DO  Dx: No diagnosis found.               Subjective: ***      Objective: See treatment diary below      Assessment: Tolerated treatment well. Patient would benefit from continued PT      Plan: Continue per plan of care.      Precautions: not significant  *HEP: 4MRYRQQR     Manuals              [facility]                                                           Neuro Re-Ed                   Pt education Prognosis, diagnosis, HEP                 Bridges nv  2x10 3x10             Clamshells  nv Pink 2x10 ea Pink 2x10 ea        SLS                  HS stretch 20\"x2                 Calf stretch 20\"x2          SLR Quad set 5\"x10  2x10 3x10             Heel slides 5\"x10                  Ther Ex                   SAQ nv  2x10 1# 2x10             LAQ nv  2x10 1# 2x10             HS curls                   Standing HR  nv  3x10 3x10             Bike   5' lvl 1 5' lvl 1                                 Ther Activity                   STS  nv 1 foam 3x10   1 foam 3x10              Side steps    nv Pink 2 laps             Step ups (FW)    nv  6\" 2x10 ea             Step ups (LAT)                   Tib raises  nv  2x15 2x15              Modalities                   cryotherapy                                                  "

## 2024-08-07 ENCOUNTER — APPOINTMENT (OUTPATIENT)
Dept: PHYSICAL THERAPY | Facility: REHABILITATION | Age: 39
End: 2024-08-07
Payer: COMMERCIAL

## 2024-08-07 NOTE — PROGRESS NOTES
"Daily Note     Today's date: 2024  Patient name: Cande Montiel  : 1985  MRN: [id number]  Referring provider: Juaquin Frankel DO  Dx:   Encounter Diagnosis     ICD-10-CM    1. Acute pain of left knee  M25.562       2. Swelling of left knee joint  M25.462       3. Other tear of medial meniscus of left knee as current injury, initial encounter  S83.242A       4. Patellofemoral disorder of left knee  M22.2X2           Start Time: 1400  Stop Time: 1445  Total time in clinic (min): 45 minutes    Subjective: Pt reports increased swelling yesterday from walking on rocks over the weekend. She was able to manage swelling with ice and gentle stretches. She states she occasionally gets HS and calf cramping at the end of the day.      Objective: See treatment diary below      Assessment: Tolerated treatment well. Pt initiated seated HS curls with verbal cues to maintain eccentric control and work through full available range with noted fatigue towards end of repetitions. Pt had increased R knee pain during squats, instructed pt in staggered stance and posterior weight shift with improved tolerance noted. Patient demonstrated fatigue post treatment and would benefit from continued PT      Plan: Continue per plan of care.      Precautions: not significant  *HEP: 4MRYRQQR     Manuals            [facility]                                                           Neuro Re-Ed                   Pt education Prognosis, diagnosis, HEP                 Bridges nv  2x10 3x10 3x10           Clamshells  nv Pink 2x10 ea Pink 2x10 ea Grn  2x10 ea       SLS                  HS stretch 20\"x2                 Calf stretch 20\"x2          SLR Quad set 5\"x10  2x10 3x10 3x10           Heel slides 5\"x10                  Ther Ex                   SAQ nv  2x10 1# 2x10 1.5# 2x10 ea           LAQ nv  2x10 1# 2x10 1.5# 2x10 ea           HS curls (matrix)       DL 45# 2x10           Standing HR  nv  3x10 " "3x10 2 up 1 down 3x10           Bike   5' lvl 1 5' lvl 1 5' lvl 1                               Ther Activity                   STS  nv 1 foam 3x10   1 foam 3x10  3x10            Side steps    nv Pink 2 laps grn 2 laps           Step ups (FW)    nv  6\" 2x10 ea   6\" 2x10 ea           Step ups (LAT)                   Tib raises  nv  2x15 2x15  2x15           Modalities                   cryotherapy                                                    "

## 2024-08-08 ENCOUNTER — OFFICE VISIT (OUTPATIENT)
Dept: PHYSICAL THERAPY | Facility: REHABILITATION | Age: 39
End: 2024-08-08
Payer: COMMERCIAL

## 2024-08-08 DIAGNOSIS — M25.462 SWELLING OF LEFT KNEE JOINT: ICD-10-CM

## 2024-08-08 DIAGNOSIS — S83.242A OTHER TEAR OF MEDIAL MENISCUS OF LEFT KNEE AS CURRENT INJURY, INITIAL ENCOUNTER: ICD-10-CM

## 2024-08-08 DIAGNOSIS — M22.2X2 PATELLOFEMORAL DISORDER OF LEFT KNEE: ICD-10-CM

## 2024-08-08 DIAGNOSIS — M25.562 ACUTE PAIN OF LEFT KNEE: Primary | ICD-10-CM

## 2024-08-08 PROCEDURE — 97110 THERAPEUTIC EXERCISES: CPT

## 2024-08-08 PROCEDURE — 97530 THERAPEUTIC ACTIVITIES: CPT

## 2024-08-08 PROCEDURE — 97112 NEUROMUSCULAR REEDUCATION: CPT

## 2024-08-12 ENCOUNTER — OFFICE VISIT (OUTPATIENT)
Dept: PHYSICAL THERAPY | Facility: REHABILITATION | Age: 39
End: 2024-08-12
Payer: COMMERCIAL

## 2024-08-12 DIAGNOSIS — S83.242A OTHER TEAR OF MEDIAL MENISCUS OF LEFT KNEE AS CURRENT INJURY, INITIAL ENCOUNTER: ICD-10-CM

## 2024-08-12 DIAGNOSIS — M25.562 ACUTE PAIN OF LEFT KNEE: Primary | ICD-10-CM

## 2024-08-12 DIAGNOSIS — M25.462 SWELLING OF LEFT KNEE JOINT: ICD-10-CM

## 2024-08-12 DIAGNOSIS — M22.2X2 PATELLOFEMORAL DISORDER OF LEFT KNEE: ICD-10-CM

## 2024-08-12 PROCEDURE — 97110 THERAPEUTIC EXERCISES: CPT

## 2024-08-12 PROCEDURE — 97530 THERAPEUTIC ACTIVITIES: CPT

## 2024-08-12 PROCEDURE — 97112 NEUROMUSCULAR REEDUCATION: CPT

## 2024-08-13 ENCOUNTER — OFFICE VISIT (OUTPATIENT)
Age: 39
End: 2024-08-13
Payer: COMMERCIAL

## 2024-08-13 VITALS
HEART RATE: 79 BPM | WEIGHT: 226 LBS | OXYGEN SATURATION: 100 % | TEMPERATURE: 98 F | DIASTOLIC BLOOD PRESSURE: 66 MMHG | SYSTOLIC BLOOD PRESSURE: 124 MMHG | BODY MASS INDEX: 36.48 KG/M2

## 2024-08-13 DIAGNOSIS — M25.562 ACUTE PAIN OF LEFT KNEE: ICD-10-CM

## 2024-08-13 DIAGNOSIS — S83.8X2D ACUTE MEDIAL MENISCAL INJURY OF KNEE, LEFT, SUBSEQUENT ENCOUNTER: Primary | ICD-10-CM

## 2024-08-13 PROCEDURE — 99213 OFFICE O/P EST LOW 20 MIN: CPT

## 2024-08-13 RX ORDER — NAPROXEN 250 MG/1
250 TABLET ORAL 2 TIMES DAILY PRN
Qty: 20 TABLET | Refills: 0 | Status: SHIPPED | OUTPATIENT
Start: 2024-08-13

## 2024-08-13 NOTE — PROGRESS NOTES
Ambulatory Visit  Name: Cande Montiel      : 1985      MRN: [id number]  Encounter Provider: Rick Knox MD  Encounter Date: 2024   Encounter department: [facility]    Assessment & Plan   Patient is a 39-year-old female with no relevant past medical history presents as follow-up of left knee pain.  Assessment at time of prior visit likely left meniscal injury, since then patient has been seen by orthopedics with findings of likely left meniscal injury.  MRI ordered at that time, patient continuing with physical therapy as ordered at last visit.  Patient states knee is mostly pain-free, completed steroid taper as prescribed by orthopedics and is now maintained on as needed Naprosyn.  Orders as below, patient advised to follow-up with orthopedics following completion of physical therapy and MRI and with this office as needed for same problem.  Return to clinic for next scheduled well or as needed for same    1. Acute medial meniscal injury of knee, left, subsequent encounter  -     naproxen (NAPROSYN) 250 mg tablet; Take 1 tablet (250 mg total) by mouth 2 (two) times a day as needed for mild pain (Knee pain or swelling) for up to 30 doses  2. Acute pain of left knee  -     naproxen (NAPROSYN) 250 mg tablet; Take 1 tablet (250 mg total) by mouth 2 (two) times a day as needed for mild pain (Knee pain or swelling) for up to 30 doses         History of Present Illness     Knee pain much improved with physical therapy     Knee Pain   The incident occurred more than 1 week ago. The incident occurred at home. There was no injury mechanism. The pain is present in the left knee. The pain is at a severity of 1/10. The pain is mild. The pain has been Improving since onset. Associated symptoms include an inability to bear weight. Pertinent negatives include no loss of motion or loss of sensation. She reports no foreign bodies present. Nothing aggravates the symptoms. She has tried ice,  NSAIDs, non-weight bearing and rest for the symptoms. The treatment provided significant relief.     Review of Systems   Constitutional:  Negative for chills and fever.   HENT:  Negative for ear pain and sore throat.    Eyes:  Negative for pain and visual disturbance.   Respiratory:  Negative for cough and shortness of breath.    Cardiovascular:  Negative for chest pain and palpitations.   Gastrointestinal:  Negative for abdominal pain and vomiting.   Genitourinary:  Negative for dysuria and hematuria.   Musculoskeletal:  Negative for arthralgias and back pain.   Skin:  Negative for color change and rash.   Neurological:  Negative for seizures and syncope.   All other systems reviewed and are negative.    History reviewed. No pertinent past medical history.  Past Surgical History:   Procedure Laterality Date    TONSILLECTOMY       Family History   Problem Relation Age of Onset    Macular degeneration Mother     Hypertension Mother     Macular degeneration Father     Diabetes type II Father     Hypertension Father     Hypertension Sister     Breast cancer Sister 46    No Known Problems Sister     No Known Problems Maternal Grandmother     No Known Problems Maternal Grandfather     No Known Problems Paternal Grandmother     No Known Problems Paternal Grandfather     No Known Problems Maternal Aunt     Thyroid cancer Maternal Aunt     No Known Problems Maternal Aunt     No Known Problems Maternal Aunt     No Known Problems Maternal Aunt      Social History     Tobacco Use    Smoking status: Never    Smokeless tobacco: Never   Vaping Use    Vaping status: Never Used   Substance and Sexual Activity    Alcohol use: Yes     Comment: social    Drug use: Never    Sexual activity: Not on file     Current Outpatient Medications on File Prior to Visit   Medication Sig    Diclofenac Sodium (VOLTAREN) 1 % Apply 2 g topically 4 (four) times a day For pain to affected area (Patient not taking: Reported on 8/13/2024)     methylPREDNISolone 4 MG tablet therapy pack Use as directed on package (Patient not taking: Reported on 8/13/2024)    [DISCONTINUED] naproxen (NAPROSYN) 250 mg tablet Take 1 tablet (250 mg total) by mouth 2 (two) times a day with meals for 10 days     No Known Allergies  Immunization History   Administered Date(s) Administered    COVID-19 PFIZER VACCINE 0.3 ML IM 05/30/2021, 06/20/2021     Objective     /66   Pulse 79   Temp 98 °F (36.7 °C)   Wt 103 kg (226 lb)   SpO2 100%   BMI 36.48 kg/m²     Physical Exam  Vitals and nursing note reviewed.   Constitutional:       General: She is not in acute distress.     Appearance: She is well-developed.   HENT:      Head: Normocephalic and atraumatic.   Eyes:      Conjunctiva/sclera: Conjunctivae normal.   Cardiovascular:      Rate and Rhythm: Normal rate and regular rhythm.      Heart sounds: No murmur heard.  Pulmonary:      Effort: Pulmonary effort is normal. No respiratory distress.      Breath sounds: Normal breath sounds.   Abdominal:      Palpations: Abdomen is soft.      Tenderness: There is no abdominal tenderness.   Musculoskeletal:         General: No swelling or tenderness.      Cervical back: Neck supple.      Right lower leg: No edema.      Left lower leg: No edema.      Comments: Knee pain and swelling improved from prior exams    Skin:     General: Skin is warm and dry.      Capillary Refill: Capillary refill takes less than 2 seconds.   Neurological:      Mental Status: She is alert.   Psychiatric:         Mood and Affect: Mood normal.

## 2024-08-14 NOTE — PROGRESS NOTES
"Daily Note     Today's date: 8/15/2024  Patient name: Cande Montiel  : 1985  MRN: [id number]  Referring provider: Juaquin Frankel DO  Dx:   Encounter Diagnosis     ICD-10-CM    1. Acute pain of left knee  M25.562       2. Swelling of left knee joint  M25.462       3. Other tear of medial meniscus of left knee as current injury, initial encounter  S83.242A       4. Patellofemoral disorder of left knee  M22.2X2           Start Time: 1450  Stop Time: 1530  Total time in clinic (min): 40 minutes    Subjective: Pt reports steady progress, no increase in pain.       Objective: See treatment diary below      Assessment: Tolerated treatment well. Pt continues to tolerate sessions with no adverse effects, provided verbal cues to work through full available range while taking intermittent rest breaks to prevent muscular fatigue with improved tolerance to session. Pt initiated leg press with cues for neutral foot and knee and terminal extension at end range with good form observed. Patient demonstrated fatigue post treatment, exhibited good technique with therapeutic exercises, and would benefit from continued PT      Plan: Continue per plan of care.      Precautions: not significant  *HEP: 4MRYRQQR     Manuals 7/25  7/31  8/1 8/8  8/12  8/15       [facility]                                                           Neuro Re-Ed                   Pt education Prognosis, diagnosis, HEP                 Bridges nv  2x10 3x10 3x10 3x10 5# 3x10       Clamshells  nv Pink 2x10 ea Pink 2x10 ea Grn  2x10 ea HEP      SLS                  HS stretch 20\"x2                 Calf stretch 20\"x2          SLR Quad set 5\"x10  2x10 3x10 3x10 3x12  3x15       Heel slides 5\"x10                  Ther Ex                   SAQ nv  2x10 1# 2x10 1.5# 2x10 ea 2# 2x10 ea  2.5# 2x10 ea       LAQ nv  2x10 1# 2x10 1.5# 2x10 ea  2# 2x10 ea  2.5# 2x10 ea       HS curls (matrix)       DL 45# 2x10  DL 55# 2x10  DL 55# 2x10     " "  Standing HR  nv  3x10 3x10 2 up 1 down 3x10 HEP         Bike   5' lvl 1 5' lvl 1 5' lvl 1 5' lvl 1  5' lvl 1                           Ther Activity                   STS  nv 1 foam 3x10   1 foam 3x10  3x10  3x10  5# 3x10       Side steps    nv Pink 2 laps grn 2 laps grn 3 laps  grn 3 laps       Step ups (FW)    nv  6\" 2x10 ea   6\" 2x10 ea  6\" 2x10 step up and overs  6\" 2x10 step up and overs       S/L leg press          nv 45# 2x10 ea       Tib raises  nv  2x15 2x15  2x15  HEP         Modalities                   cryotherapy                                                        "

## 2024-08-15 ENCOUNTER — OFFICE VISIT (OUTPATIENT)
Dept: PHYSICAL THERAPY | Facility: REHABILITATION | Age: 39
End: 2024-08-15
Payer: COMMERCIAL

## 2024-08-15 DIAGNOSIS — M25.462 SWELLING OF LEFT KNEE JOINT: ICD-10-CM

## 2024-08-15 DIAGNOSIS — M25.562 ACUTE PAIN OF LEFT KNEE: Primary | ICD-10-CM

## 2024-08-15 DIAGNOSIS — M22.2X2 PATELLOFEMORAL DISORDER OF LEFT KNEE: ICD-10-CM

## 2024-08-15 DIAGNOSIS — S83.242A OTHER TEAR OF MEDIAL MENISCUS OF LEFT KNEE AS CURRENT INJURY, INITIAL ENCOUNTER: ICD-10-CM

## 2024-08-15 PROCEDURE — 97112 NEUROMUSCULAR REEDUCATION: CPT

## 2024-08-15 PROCEDURE — 97530 THERAPEUTIC ACTIVITIES: CPT

## 2024-08-15 PROCEDURE — 97110 THERAPEUTIC EXERCISES: CPT

## 2024-08-16 NOTE — PROGRESS NOTES
"Daily Note     Today's date: 2024  Patient name: Cande Montiel  : 1985  MRN: [id number]  Referring provider: Juaquin Frankel DO  Dx:   Encounter Diagnosis     ICD-10-CM    1. Acute pain of left knee  M25.562       2. Swelling of left knee joint  M25.462       3. Other tear of medial meniscus of left knee as current injury, initial encounter  S83.242A       4. Patellofemoral disorder of left knee  M22.2X2           Start Time: 1400  Stop Time: 1445  Total time in clinic (min): 45 minutes    Subjective: Pt reports steady progress, no increase in pain.       Objective: See treatment diary below      Assessment: Tolerated treatment well. Pt tolerated increased resistance during quad strengthening progressions, instructed pt in terminal knee extension during SLR and knee extension variations with noted quad fatigue towards end of repetitions. Patient demonstrated fatigue post treatment, exhibited good technique with therapeutic exercises, and would benefit from continued PT      Plan: Continue per plan of care.      Precautions: not significant  *HEP: 4MRYRQQR     Manuals 7/25  7/31  8/1 8/8  8/12  8/15  8/19     [facility]                                                           Neuro Re-Ed                   Pt education Prognosis, diagnosis, HEP                 Bridges nv  2x10 3x10 3x10 3x10 5# 3x10  8# 3x10     Clamshells  nv Pink 2x10 ea Pink 2x10 ea Grn  2x10 ea HEP      SLS                  HS stretch 20\"x2                 Calf stretch 20\"x2          SLR Quad set 5\"x10  2x10 3x10 3x10 3x12  3x15  1# 3x8     Heel slides 5\"x10                  Ther Ex                   SAQ nv  2x10 1# 2x10 1.5# 2x10 ea 2# 2x10 ea  2.5# 2x10 ea  3# 2x10 ea     LAQ nv  2x10 1# 2x10 1.5# 2x10 ea  2# 2x10 ea  2.5# 2x10 ea  3# 2x10 ea     HS curls (matrix)       DL 45# 2x10  DL 55# 2x10  DL 55# 2x10  DL 60# 2x10     Standing HR  nv  3x10 3x10 2 up 1 down 3x10 HEP   1 up 1 down 2x10     Bike   5' lvl 1 5' " "lvl 1 5' lvl 1 5' lvl 1  5' lvl 1  5' lvl 1                         Ther Activity                   STS  nv 1 foam 3x10   1 foam 3x10  3x10  3x10  5# 3x10  8# 3x10     Side steps    nv Pink 2 laps grn 2 laps grn 3 laps  grn 3 laps  blue 3 laps     Step ups (FW)    nv  6\" 2x10 ea   6\" 2x10 ea  6\" 2x10 step up and overs  6\" 2x10 step up and overs  8\" 2x10 step up and overs     S/L leg press (position 5)          nv 45# 2x10 ea  55# 2x10 ea     Tib raises  nv  2x15 2x15  2x15  HEP         Modalities                   cryotherapy                                                          "

## 2024-08-19 ENCOUNTER — OFFICE VISIT (OUTPATIENT)
Dept: PHYSICAL THERAPY | Facility: REHABILITATION | Age: 39
End: 2024-08-19
Payer: COMMERCIAL

## 2024-08-19 DIAGNOSIS — M25.462 SWELLING OF LEFT KNEE JOINT: ICD-10-CM

## 2024-08-19 DIAGNOSIS — S83.242A OTHER TEAR OF MEDIAL MENISCUS OF LEFT KNEE AS CURRENT INJURY, INITIAL ENCOUNTER: ICD-10-CM

## 2024-08-19 DIAGNOSIS — M25.562 ACUTE PAIN OF LEFT KNEE: Primary | ICD-10-CM

## 2024-08-19 DIAGNOSIS — M22.2X2 PATELLOFEMORAL DISORDER OF LEFT KNEE: ICD-10-CM

## 2024-08-19 PROCEDURE — 97112 NEUROMUSCULAR REEDUCATION: CPT

## 2024-08-19 PROCEDURE — 97530 THERAPEUTIC ACTIVITIES: CPT

## 2024-08-19 PROCEDURE — 97110 THERAPEUTIC EXERCISES: CPT

## 2024-08-21 NOTE — PROGRESS NOTES
"Daily Note     Today's date: 2024  Patient name: Cande Montiel  : 1985  MRN: [id number]  Referring provider: Juaquin Frankel DO  Dx: No diagnosis found.               Subjective: ***      Objective: See treatment diary below      Assessment: Tolerated treatment well. Patient demonstrated fatigue post treatment, exhibited good technique with therapeutic exercises, and would benefit from continued PT      Plan: Continue per plan of care.      Precautions: not significant  *HEP: 4MRYRQQR     Manuals 7/25  7/31  8/1 8/8  8/12  8/15  8/19     [facility]                                                           Neuro Re-Ed                   Pt education Prognosis, diagnosis, HEP                 Bridges nv  2x10 3x10 3x10 3x10 5# 3x10  8# 3x10     Clamshells  nv Pink 2x10 ea Pink 2x10 ea Grn  2x10 ea HEP      SLS                  HS stretch 20\"x2                 Calf stretch 20\"x2          SLR Quad set 5\"x10  2x10 3x10 3x10 3x12  3x15  1# 3x8     Heel slides 5\"x10                  Ther Ex                   SAQ nv  2x10 1# 2x10 1.5# 2x10 ea 2# 2x10 ea  2.5# 2x10 ea  3# 2x10 ea     LAQ nv  2x10 1# 2x10 1.5# 2x10 ea  2# 2x10 ea  2.5# 2x10 ea  3# 2x10 ea     HS curls (matrix)       DL 45# 2x10  DL 55# 2x10  DL 55# 2x10  DL 60# 2x10     Standing HR  nv  3x10 3x10 2 up 1 down 3x10 HEP   1 up 1 down 2x10     Bike   5' lvl 1 5' lvl 1 5' lvl 1 5' lvl 1  5' lvl 1  5' lvl 1                         Ther Activity                   STS  nv 1 foam 3x10   1 foam 3x10  3x10  3x10  5# 3x10  8# 3x10     Side steps    nv Pink 2 laps grn 2 laps grn 3 laps  grn 3 laps  blue 3 laps     Step ups (FW)    nv  6\" 2x10 ea   6\" 2x10 ea  6\" 2x10 step up and overs  6\" 2x10 step up and overs  8\" 2x10 step up and overs     S/L leg press (position 5)          nv 45# 2x10 ea  55# 2x10 ea     Tib raises  nv  2x15 2x15  2x15  HEP         Modalities                   cryotherapy                                       "

## 2024-08-22 ENCOUNTER — APPOINTMENT (OUTPATIENT)
Dept: PHYSICAL THERAPY | Facility: REHABILITATION | Age: 39
End: 2024-08-22
Payer: COMMERCIAL

## 2024-08-23 NOTE — PROGRESS NOTES
"Daily Note     Today's date: 2024  Patient name: Cande Montiel  : 1985  MRN: [id number]  Referring provider: Juaquin Frankel DO  Dx:   Encounter Diagnosis     ICD-10-CM    1. Acute pain of left knee  M25.562       2. Swelling of left knee joint  M25.462       3. Other tear of medial meniscus of left knee as current injury, initial encounter  S83.242A       4. Patellofemoral disorder of left knee  M22.2X2           Start Time: 1420  Stop Time: 1500  Total time in clinic (min): 40 minutes    Subjective: Pt reports steady progress, no increase in pain or swelling. Pt reports her R knee has actually been \"popping\" more than her L.       Objective: See treatment diary below      Assessment: Tolerated treatment well. Pt progressed with resistance and repetitions with no adverse effects, provided intermittent rest breaks to prevent fatigue with improved form observed. Patient demonstrated fatigue post treatment, exhibited good technique with therapeutic exercises, and would benefit from continued PT      Plan: Continue per plan of care.      Precautions: not significant  *HEP: 4MRYRQQR     Manuals 7/25  7/31  8/1 8/8  8/12  8/15  8/19  8/26   [facility]                                                           Neuro Re-Ed                   Pt education Prognosis, diagnosis, HEP                 Bridges nv  2x10 3x10 3x10 3x10 5# 3x10  8# 3x10  10# 3x10   Clamshells  nv Pink 2x10 ea Pink 2x10 ea Grn  2x10 ea HEP      SLS                  HS stretch 20\"x2                 Calf stretch 20\"x2          SLR Quad set 5\"x10  2x10 3x10 3x10 3x12  3x15  1# 3x8   1# 3x10   Heel slides 5\"x10                  Ther Ex                   SAQ nv  2x10 1# 2x10 1.5# 2x10 ea 2# 2x10 ea  2.5# 2x10 ea  3# 2x10 ea  4# 2x10 ea   LAQ nv  2x10 1# 2x10 1.5# 2x10 ea  2# 2x10 ea  2.5# 2x10 ea  3# 2x10 ea  4# 2x10 ea   HS curls (matrix)       DL 45# 2x10  DL 55# 2x10  DL 55# 2x10  DL 60# 2x10   DL 65# 2x10   Standing HR  " "nv  3x10 3x10 2 up 1 down 3x10 HEP   1 up 1 down 2x10 1 up 1 down 2x10   Bike   5' lvl 1 5' lvl 1 5' lvl 1 5' lvl 1  5' lvl 1  5' lvl 1   5' lvl 1                       Ther Activity                   STS  nv 1 foam 3x10   1 foam 3x10  3x10  3x10  5# 3x10  8# 3x10  10# 3x10   Side steps    nv Pink 2 laps grn 2 laps grn 3 laps  grn 3 laps  blue 3 laps  blue 3 laps   Step ups (FW)    nv  6\" 2x10 ea   6\" 2x10 ea  6\" 2x10 step up and overs  6\" 2x10 step up and overs  8\" 2x10 step up and overs  8\" 2x10 step up and overs   S/L leg press (position 5)          nv 45# 2x10 ea  55# 2x10 ea 65# 2x10 ea   Tib raises  nv  2x15 2x15  2x15  HEP         Modalities                   cryotherapy                                                              "

## 2024-08-26 ENCOUNTER — OFFICE VISIT (OUTPATIENT)
Dept: PHYSICAL THERAPY | Facility: REHABILITATION | Age: 39
End: 2024-08-26
Payer: COMMERCIAL

## 2024-08-26 DIAGNOSIS — M22.2X2 PATELLOFEMORAL DISORDER OF LEFT KNEE: ICD-10-CM

## 2024-08-26 DIAGNOSIS — M25.462 SWELLING OF LEFT KNEE JOINT: ICD-10-CM

## 2024-08-26 DIAGNOSIS — S83.242A OTHER TEAR OF MEDIAL MENISCUS OF LEFT KNEE AS CURRENT INJURY, INITIAL ENCOUNTER: ICD-10-CM

## 2024-08-26 DIAGNOSIS — M25.562 ACUTE PAIN OF LEFT KNEE: Primary | ICD-10-CM

## 2024-08-26 PROCEDURE — 97530 THERAPEUTIC ACTIVITIES: CPT

## 2024-08-26 PROCEDURE — 97110 THERAPEUTIC EXERCISES: CPT

## 2024-08-26 PROCEDURE — 97112 NEUROMUSCULAR REEDUCATION: CPT

## 2024-08-27 NOTE — PROGRESS NOTES
Daily Note     Today's date: 2024  Patient name: Cande Heredia  : 1985  MRN: [id number]  Referring provider: Juaquin Frankel DO  Dx:   Encounter Diagnosis     ICD-10-CM    1. Acute pain of left knee  M25.562       2. Swelling of left knee joint  M25.462       3. Other tear of medial meniscus of left knee as current injury, initial encounter  S83.242A       4. Patellofemoral disorder of left knee  M22.2X2           Start Time: 1215  Stop Time: 1300  Total time in clinic (min): 45 minutes    Subjective: Pt reports she went to MD this morning who was doing some tests on her knee resulting in increased lateral knee pain today. She states MD reported to continue PT, offered gel injections but she declined at the current time.       Objective: See treatment diary below      Assessment: Tolerated treatment well. Avoided pain throughout exercises to prevent over exacerbation with no adverse effects observed throughout session. Provided pt education on PRICE and gentle stretches to reduce discomfort with good understanding. Patient demonstrated fatigue post treatment, exhibited good technique with therapeutic exercises, and would benefit from continued PT      Plan: Continue per plan of care.      Precautions: not significant  *HEP: 4MRYRQQR     Manuals 8/28   8/1 8/8  8/12  8/15  8/19  8/26   [facility]                                                     Neuro Re-Ed                 Pt education                 Bridges 10# 3x10  3x10 3x10 3x10 5# 3x10  8# 3x10  10# 3x10   Clamshells    Pink 2x10 ea Grn  2x10 ea HEP      SLS                 HS stretch                 Calf stretch           SLR  1# 3x10  3x10 3x10 3x12  3x15  1# 3x8   1# 3x10   Heel slides                 Ther Ex                 SAQ 5# 2x10 ea  1# 2x10 1.5# 2x10 ea 2# 2x10 ea  2.5# 2x10 ea  3# 2x10 ea  4# 2x10 ea   LAQ 5# 2x10 ea  1# 2x10 1.5# 2x10 ea  2# 2x10 ea  2.5# 2x10 ea  3# 2x10 ea  4# 2x10 ea   HS curls (matrix) DL 75#  "2x10    DL 45# 2x10  DL 55# 2x10  DL 55# 2x10  DL 60# 2x10   DL 65# 2x10   Standing HR 1 up 1 down 2x10  3x10 2 up 1 down 3x10 HEP   1 up 1 down 2x10 1 up 1 down 2x10   Bike 5' lvl 1  5' lvl 1 5' lvl 1 5' lvl 1  5' lvl 1  5' lvl 1   5' lvl 1                     Ther Activity                 STS  10# 3x10   1 foam 3x10  3x10  3x10  5# 3x10  8# 3x10  10# 3x10   Side steps blue 3 laps  Pink 2 laps grn 2 laps grn 3 laps  grn 3 laps  blue 3 laps  blue 3 laps   Step ups (FW) 8\" 2x10 step up and overs   6\" 2x10 ea   6\" 2x10 ea  6\" 2x10 step up and overs  6\" 2x10 step up and overs  8\" 2x10 step up and overs  8\" 2x10 step up and overs   S/L leg press (position 5) 65# 2x10 ea       nv 45# 2x10 ea  55# 2x10 ea 65# 2x10 ea   Tib raises   2x15  2x15  HEP         Modalities                 cryotherapy                                                             "

## 2024-08-28 ENCOUNTER — OFFICE VISIT (OUTPATIENT)
Dept: OBGYN CLINIC | Facility: CLINIC | Age: 39
End: 2024-08-28
Payer: COMMERCIAL

## 2024-08-28 ENCOUNTER — OFFICE VISIT (OUTPATIENT)
Dept: PHYSICAL THERAPY | Facility: REHABILITATION | Age: 39
End: 2024-08-28
Payer: COMMERCIAL

## 2024-08-28 VITALS
SYSTOLIC BLOOD PRESSURE: 133 MMHG | DIASTOLIC BLOOD PRESSURE: 85 MMHG | BODY MASS INDEX: 36.32 KG/M2 | HEART RATE: 87 BPM | WEIGHT: 226 LBS | HEIGHT: 66 IN

## 2024-08-28 DIAGNOSIS — M17.12 PATELLOFEMORAL ARTHRITIS OF LEFT KNEE: Primary | ICD-10-CM

## 2024-08-28 DIAGNOSIS — M22.2X2 PATELLOFEMORAL DISORDER OF LEFT KNEE: ICD-10-CM

## 2024-08-28 DIAGNOSIS — S83.242A OTHER TEAR OF MEDIAL MENISCUS OF LEFT KNEE AS CURRENT INJURY, INITIAL ENCOUNTER: ICD-10-CM

## 2024-08-28 DIAGNOSIS — M25.462 SWELLING OF LEFT KNEE JOINT: ICD-10-CM

## 2024-08-28 DIAGNOSIS — M25.562 ACUTE PAIN OF LEFT KNEE: Primary | ICD-10-CM

## 2024-08-28 PROCEDURE — 99212 OFFICE O/P EST SF 10 MIN: CPT | Performed by: ORTHOPAEDIC SURGERY

## 2024-08-28 PROCEDURE — 97112 NEUROMUSCULAR REEDUCATION: CPT

## 2024-08-28 PROCEDURE — 97530 THERAPEUTIC ACTIVITIES: CPT

## 2024-08-28 PROCEDURE — 97110 THERAPEUTIC EXERCISES: CPT

## 2024-08-28 NOTE — PROGRESS NOTES
Assessment:  1. Patellofemoral arthritis of left knee          There is no problem list on file for this patient.      Plan:    39 y.o. female  with patellofemoral arthritis of the left knee    Discussed with patient great relief with physical therapy secondary to strengthening muscles around the knee to help support maltracking of the patella.  Discussed continuing with physical therapy as symptoms have greatly improved.  Joaquim with patient that she should be provided a home exercise program that she should continue when she is ultimately discharged from physical therapy limit return or increase of symptoms.  Patient expressed understanding.  Patient to continue with physical therapy.  Discussed gel injections of the left knee.  Discussed side effects and risks of injection.  Deferred at this time as she is not having significant symptoms. Discussed to contact office if would like to proceed.  Gel injections take up to 4 to 6 weeks for prior authorization.  Patient expressed understanding.  Patient agreeable.  Discussed continued use of over-the-counter anti-inflammatories as needed for persistent pain and inflammation.  Discussed can repeat Medrol Dosepak if needed for flares.  Patient expressed understanding.  Patient agreeable.  Discussed MRI is not needed at this time as not suspicious for a meniscus tear secondary to persistent anterior knee pain overall improvement of symptoms.  Follow-up as needed for persistent pain and inflammation    The assessment and plan were formulated by Dr. Frankel and I assisted in carrying it out.    Subjective:   Patient ID: Cande Heredia is a 39 y.o. female .    HPI    Patient presents to the office for follow up of left knee pain.  Patient notes great improvement to the left knee since previous visit with Medrol Dosepak and physical therapy.  Notes some persistent anterior knee pain sharp in nature at times.    The following portions of the patient's history were reviewed  and updated as appropriate: allergies, current medications, past family history, past social history, past surgical history and problem list.    Social History     Socioeconomic History    Marital status: /Civil Union     Spouse name: Not on file    Number of children: Not on file    Years of education: Not on file    Highest education level: Not on file   Occupational History    Not on file   Tobacco Use    Smoking status: Never    Smokeless tobacco: Never   Vaping Use    Vaping status: Never Used   Substance and Sexual Activity    Alcohol use: Yes     Comment: social    Drug use: Never    Sexual activity: Not on file   Other Topics Concern    Not on file   Social History Narrative    Not on file     Social Determinants of Health     Financial Resource Strain: Not on file   Food Insecurity: Not on file   Transportation Needs: Not on file   Physical Activity: Not on file   Stress: Not on file   Social Connections: Not on file   Intimate Partner Violence: Not on file   Housing Stability: Not on file     History reviewed. No pertinent past medical history.  Past Surgical History:   Procedure Laterality Date    TONSILLECTOMY       No Known Allergies  Current Outpatient Medications on File Prior to Visit   Medication Sig Dispense Refill    Diclofenac Sodium (VOLTAREN) 1 % Apply 2 g topically 4 (four) times a day For pain to affected area (Patient not taking: Reported on 8/13/2024) 100 g 0    methylPREDNISolone 4 MG tablet therapy pack Use as directed on package (Patient not taking: Reported on 8/13/2024) 21 each 0    naproxen (NAPROSYN) 250 mg tablet Take 1 tablet (250 mg total) by mouth 2 (two) times a day as needed for mild pain (Knee pain or swelling) for up to 30 doses 20 tablet 0     No current facility-administered medications on file prior to visit.       Review of Systems   Constitutional:  Negative for chills and fever.   HENT:  Negative for ear pain and sore throat.    Eyes:  Negative for pain and  "visual disturbance.   Respiratory:  Negative for cough and shortness of breath.    Cardiovascular:  Negative for chest pain and palpitations.   Gastrointestinal:  Negative for abdominal pain and vomiting.   Genitourinary:  Negative for dysuria and hematuria.   Musculoskeletal:  Positive for arthralgias. Negative for back pain.   Skin:  Negative for color change and rash.   Neurological:  Negative for seizures and syncope.   All other systems reviewed and are negative.    See HPi    Objective:    Vitals:    08/28/24 0908   BP: 133/85   Pulse: 87       Physical Exam  Vitals and nursing note reviewed.   Constitutional:       General: She is not in acute distress.     Appearance: She is well-developed.   HENT:      Head: Normocephalic and atraumatic.   Eyes:      Conjunctiva/sclera: Conjunctivae normal.   Cardiovascular:      Rate and Rhythm: Normal rate and regular rhythm.      Heart sounds: No murmur heard.  Pulmonary:      Effort: Pulmonary effort is normal. No respiratory distress.      Breath sounds: Normal breath sounds.   Abdominal:      Palpations: Abdomen is soft.      Tenderness: There is no abdominal tenderness.   Musculoskeletal:         General: No swelling.      Cervical back: Neck supple.      Left knee: No effusion.   Skin:     General: Skin is warm and dry.      Capillary Refill: Capillary refill takes less than 2 seconds.   Neurological:      Mental Status: She is alert.   Psychiatric:         Mood and Affect: Mood normal.         Left Knee Exam   Left knee exam is normal.    Tenderness   The patient is experiencing tenderness in the patella.    Range of Motion   Extension:  normal   Flexion:  normal     Other   Erythema: absent  Scars: absent  Effusion: no effusion present    Comments:  +Palpable crepitus patella   +Patellar grind           Procedures  No Procedures performed today       Portions of the record may have been created with voice recognition software.  Occasional wrong word or \"sound a " "like\" substitutions may have occurred due to the inherent limitations of voice recognition software.  Read the chart carefully and recognize, using context, where substitutions have occurred.   "

## 2024-09-03 NOTE — PROGRESS NOTES
Daily Note     Today's date: 2024  Patient name: Cande Heredia  : 1985  MRN: [id number]  Referring provider: Juaquin Frankel DO  Dx:   Encounter Diagnosis     ICD-10-CM    1. Acute pain of left knee  M25.562       2. Swelling of left knee joint  M25.462       3. Other tear of medial meniscus of left knee as current injury, initial encounter  S83.242A       4. Patellofemoral disorder of left knee  M22.2X2           Start Time: 1300  Stop Time: 1340  Total time in clinic (min): 40 minutes    Subjective: Pt reports her anterior knee has been bothering her since her last MD visit, especially going up and down stairs.       Objective: See treatment diary below      Assessment: Tolerated treatment well. Avoided eccentric step down to prevent increased anterior knee pain exacerbation, instructed pt in tib raises and reviewed HEP to facilitate return to PLOF with good understanding. Pt tolerated session with no adverse effects and no increase in pain. Patient demonstrated fatigue post treatment, exhibited good technique with therapeutic exercises, and would benefit from continued PT      Plan: Continue per plan of care.      Precautions: not significant  *HEP: [id number]     Manuals 8/28 9/4  8/8  8/12  8/15  8/19  8/26   STM                IAS                                                  Neuro Re-Ed                Pt education                Bridges 10# 3x10 10# 3x10  3x10 3x10 5# 3x10  8# 3x10  10# 3x10   Clamshells     Grn  2x10 ea HEP      SLS                HS stretch                Calf stretch           SLR  1# 3x10  1# 3x10  3x10 3x12  3x15  1# 3x8   1# 3x10   Heel slides                Ther Ex                SAQ 5# 2x10 ea 5# 2x10 ea  1.5# 2x10 ea 2# 2x10 ea  2.5# 2x10 ea  3# 2x10 ea  4# 2x10 ea   LAQ 5# 2x10 ea 5# 2x10 ea  1.5# 2x10 ea  2# 2x10 ea  2.5# 2x10 ea  3# 2x10 ea  4# 2x10 ea   HS curls (matrix) DL 75# 2x10 DL 75# 2x10  DL 45# 2x10  DL 55# 2x10  DL 55# 2x10  DL 60# 2x10   DL 65# 2x10  "  Standing HR 1 up 1 down 2x10 1 up 1 down 2x10  2 up 1 down 3x10 HEP   1 up 1 down 2x10 1 up 1 down 2x10   Bike 5' lvl 1 5' lvl 1  5' lvl 1 5' lvl 1  5' lvl 1  5' lvl 1   5' lvl 1                    Ther Activity                STS  10# 3x10 10# 3x10  3x10  3x10  5# 3x10  8# 3x10  10# 3x10   Side steps blue 3 laps blue 3 laps  grn 2 laps grn 3 laps  grn 3 laps  blue 3 laps  blue 3 laps   Step ups (FW) 8\" 2x10 step up and overs nv    6\" 2x10 ea  6\" 2x10 step up and overs  6\" 2x10 step up and overs  8\" 2x10 step up and overs  8\" 2x10 step up and overs   S/L leg press (position 5) 65# 2x10 ea 75# 2x10 ea     nv 45# 2x10 ea  55# 2x10 ea 65# 2x10 ea   Tib raises    2x15  HEP         Modalities                cryotherapy                                                             "

## 2024-09-04 ENCOUNTER — OFFICE VISIT (OUTPATIENT)
Dept: PHYSICAL THERAPY | Facility: REHABILITATION | Age: 39
End: 2024-09-04
Payer: COMMERCIAL

## 2024-09-04 DIAGNOSIS — S83.242A OTHER TEAR OF MEDIAL MENISCUS OF LEFT KNEE AS CURRENT INJURY, INITIAL ENCOUNTER: ICD-10-CM

## 2024-09-04 DIAGNOSIS — M25.462 SWELLING OF LEFT KNEE JOINT: ICD-10-CM

## 2024-09-04 DIAGNOSIS — M25.562 ACUTE PAIN OF LEFT KNEE: Primary | ICD-10-CM

## 2024-09-04 DIAGNOSIS — M22.2X2 PATELLOFEMORAL DISORDER OF LEFT KNEE: ICD-10-CM

## 2024-09-04 PROCEDURE — 97530 THERAPEUTIC ACTIVITIES: CPT

## 2024-09-04 PROCEDURE — 97112 NEUROMUSCULAR REEDUCATION: CPT

## 2024-09-04 PROCEDURE — 97110 THERAPEUTIC EXERCISES: CPT

## 2024-09-05 ENCOUNTER — OFFICE VISIT (OUTPATIENT)
Age: 39
End: 2024-09-05
Payer: COMMERCIAL

## 2024-09-05 VITALS
SYSTOLIC BLOOD PRESSURE: 126 MMHG | TEMPERATURE: 97.8 F | OXYGEN SATURATION: 98 % | HEIGHT: 66 IN | BODY MASS INDEX: 36.8 KG/M2 | DIASTOLIC BLOOD PRESSURE: 70 MMHG | WEIGHT: 229 LBS | HEART RATE: 93 BPM | RESPIRATION RATE: 18 BRPM

## 2024-09-05 DIAGNOSIS — R73.01 IMPAIRED FASTING GLUCOSE: ICD-10-CM

## 2024-09-05 DIAGNOSIS — D50.9 MICROCYTIC ANEMIA: ICD-10-CM

## 2024-09-05 DIAGNOSIS — Z00.00 ANNUAL PHYSICAL EXAM: Primary | ICD-10-CM

## 2024-09-05 DIAGNOSIS — E03.8 SUBCLINICAL HYPOTHYROIDISM: ICD-10-CM

## 2024-09-05 PROCEDURE — 99213 OFFICE O/P EST LOW 20 MIN: CPT

## 2024-09-05 PROCEDURE — 99396 PREV VISIT EST AGE 40-64: CPT

## 2024-09-05 PROCEDURE — 3725F SCREEN DEPRESSION PERFORMED: CPT

## 2024-09-05 NOTE — PROGRESS NOTES
Adult Annual Physical  Name: Cande Heredia      : 1985      MRN: [id number]  Encounter Provider: Rick Knox MD  Encounter Date: 2024   Encounter department: [facility]    Assessment & Plan   Patient is a 39-year-old female with past medical history of left knee patellofemoral pain syndrome managed with physical therapy who presents today for annual well visit.  Patient endorses feeling overall well, no acute concerns at time of visit today.  Patient eats well-balanced diet with moderate exercise in the form of physical therapy, endorses 3 pound weight loss over the past several weeks as well as continued efforts at dietary and lifestyle modification.  Recent CMP and lipid panel reviewed with patient with sole finding of very slightly elevated fasting serum glucose, no acute intervention needed at this time for these labs but will continue to monitor through time.  Review of prior laboratory studies additionally demonstrated elevated TSH as well as historic microcytic anemia.  As these have not been rechecked in some time, orders as below.  Return to clinic in 3 months for interpretation of lab studies or sooner if needed for problem visit.    1. Annual physical exam  2. Subclinical hypothyroidism  -     TSH, 3rd generation with Free T4 reflex; Future  3. Microcytic anemia  -     CBC and differential; Future  -     Iron Panel (Includes Ferritin, Iron Sat%, Iron, and TIBC); Future  4. Impaired fasting glucose  Assessment & Plan:  Fasting glucose of 104, mildly elevated at this point in time  Patient endorses dietary and lifestyle modifications aimed at reducing weight and increasing fitness, encouraged to continue with these interventions. Can recheck in 6 to 12 months, in the meantime we will continue to encourage nonpharmacologic methods of reduction    Immunizations and preventive care screenings were discussed with patient today. Appropriate education was printed on  patient's after visit summary.    Counseling:  Alcohol/drug use: discussed moderation in alcohol intake, the recommendations for healthy alcohol use, and avoidance of illicit drug use.  Dental Health: discussed importance of regular tooth brushing, flossing, and dental visits.  Injury prevention: discussed safety/seat belts, safety helmets, smoke detectors, carbon dioxide detectors, and smoking near bedding or upholstery.  Sexual health: discussed sexually transmitted diseases, partner selection, use of condoms, avoidance of unintended pregnancy, and contraceptive alternatives.  Exercise: the importance of regular exercise/physical activity was discussed. Recommend exercise 3-5 times per week for at least 30 minutes.     Pt denies any acute concerns.         Depression Screening and Follow-up Plan: Patient was screened for depression during today's encounter. They screened negative with a PHQ-2 score of 0.        History of Present Illness     Adult Annual Physical:  Patient presents for annual physical.     Diet and Physical Activity:  - Diet/Nutrition: portion control, consuming 3-5 servings of fruits/vegetables daily, limited junk food, well balanced diet and adequate fiber intake.  - Exercise: strength training exercises, 3-4 times a week on average and 30-60 minutes on average.    Depression Screening:  - PHQ-2 Score: 0    General Health:  - Sleep: > 8 hours of sleep on average. 8-9  - Hearing: normal hearing right ear and normal hearing left ear.  - Vision: no vision problems and most recent eye exam > 1 year ago.  - Dental: no dental visits for > 1 year.    /GYN Health:  - Follows with GYN: yes.   - Menopause: premenopausal.   - History of STDs: no  - Contraception: barrier methods.      Advanced Care Planning:  - Has an advanced directive?: no    - Has a durable medical POA?: no    - ACP document given to patient?: no      Review of Systems   Constitutional:  Negative for chills and fever.   HENT:   Negative for ear pain and sore throat.    Eyes:  Negative for pain and visual disturbance.   Respiratory:  Negative for cough and shortness of breath.    Cardiovascular:  Negative for chest pain and palpitations.   Gastrointestinal:  Negative for abdominal pain and vomiting.   Genitourinary:  Negative for dysuria and hematuria.   Musculoskeletal:  Negative for arthralgias and back pain.   Skin:  Negative for color change and rash.   Neurological:  Negative for seizures and syncope.   All other systems reviewed and are negative.    Medical History Reviewed by provider this encounter:  Tobacco  Allergies  Meds  Problems  Med Hx  Surg Hx  Fam Hx       Past Medical History   History reviewed. No pertinent past medical history.  Past Surgical History:   Procedure Laterality Date    TONSILLECTOMY       Family History   Problem Relation Age of Onset    Macular degeneration Mother     Hypertension Mother     Macular degeneration Father     Diabetes type II Father     Hypertension Father     Hypertension Sister     Breast cancer Sister 46    No Known Problems Sister     No Known Problems Maternal Grandmother     No Known Problems Maternal Grandfather     No Known Problems Paternal Grandmother     No Known Problems Paternal Grandfather     No Known Problems Maternal Aunt     Thyroid cancer Maternal Aunt     No Known Problems Maternal Aunt     No Known Problems Maternal Aunt     No Known Problems Maternal Aunt      Current Outpatient Medications on File Prior to Visit   Medication Sig Dispense Refill    naproxen (NAPROSYN) 250 mg tablet Take 1 tablet (250 mg total) by mouth 2 (two) times a day as needed for mild pain (Knee pain or swelling) for up to 30 doses 20 tablet 0    Diclofenac Sodium (VOLTAREN) 1 % Apply 2 g topically 4 (four) times a day For pain to affected area (Patient not taking: Reported on 8/13/2024) 100 g 0    methylPREDNISolone 4 MG tablet therapy pack Use as directed on package (Patient not taking:  "Reported on 8/13/2024) 21 each 0     No current facility-administered medications on file prior to visit.   No Known Allergies   Current Outpatient Medications on File Prior to Visit   Medication Sig Dispense Refill    naproxen (NAPROSYN) 250 mg tablet Take 1 tablet (250 mg total) by mouth 2 (two) times a day as needed for mild pain (Knee pain or swelling) for up to 30 doses 20 tablet 0    Diclofenac Sodium (VOLTAREN) 1 % Apply 2 g topically 4 (four) times a day For pain to affected area (Patient not taking: Reported on 8/13/2024) 100 g 0    methylPREDNISolone 4 MG tablet therapy pack Use as directed on package (Patient not taking: Reported on 8/13/2024) 21 each 0     No current facility-administered medications on file prior to visit.      Social History     Tobacco Use    Smoking status: Never    Smokeless tobacco: Never   Vaping Use    Vaping status: Never Used   Substance and Sexual Activity    Alcohol use: Yes     Comment: social    Drug use: Never    Sexual activity: Not on file       Objective     /70   Pulse 93   Temp 97.8 °F (36.6 °C)   Resp 18   Ht 5' 6\" (1.676 m)   Wt 104 kg (229 lb)   SpO2 98%   BMI 36.96 kg/m²     Physical Exam  Vitals and nursing note reviewed.   Constitutional:       General: She is not in acute distress.     Appearance: She is well-developed.   HENT:      Head: Normocephalic and atraumatic.      Right Ear: External ear normal.      Left Ear: External ear normal.      Mouth/Throat:      Mouth: Mucous membranes are moist.      Pharynx: Oropharynx is clear.   Eyes:      General: No scleral icterus.     Extraocular Movements: Extraocular movements intact.      Conjunctiva/sclera: Conjunctivae normal.   Cardiovascular:      Rate and Rhythm: Normal rate and regular rhythm.      Pulses: Normal pulses.      Heart sounds: Normal heart sounds. No murmur heard.     No friction rub. No gallop.   Pulmonary:      Effort: Pulmonary effort is normal. No respiratory distress.      Breath " sounds: Normal breath sounds.   Abdominal:      General: Abdomen is flat.      Palpations: Abdomen is soft. There is no mass.      Tenderness: There is no abdominal tenderness. There is no guarding.   Musculoskeletal:         General: No swelling or tenderness.      Cervical back: Normal range of motion and neck supple. No tenderness.      Right lower leg: No edema.      Left lower leg: No edema.      Comments: Knee pain and swelling improved from prior exams    Lymphadenopathy:      Cervical: No cervical adenopathy.   Skin:     General: Skin is warm and dry.      Capillary Refill: Capillary refill takes less than 2 seconds.   Neurological:      Mental Status: She is alert. Mental status is at baseline.   Psychiatric:         Mood and Affect: Mood normal.

## 2024-09-05 NOTE — ASSESSMENT & PLAN NOTE
Fasting glucose of 104, mildly elevated at this point in time  Patient endorses dietary and lifestyle modifications aimed at reducing weight and increasing fitness, encouraged to continue with these interventions. Can recheck in 6 to 12 months, in the meantime we will continue to encourage nonpharmacologic methods of reduction

## 2024-09-05 NOTE — PATIENT INSTRUCTIONS
"Patient Education     Routine physical for adults   The Basics   Written by the doctors and editors at [facility]   What is a physical? -- A physical is a routine visit, or \"check-up,\" with your doctor. You might also hear it called a \"wellness visit\" or \"preventive visit.\"  During each visit, the doctor will:   Ask about your physical and mental health   Ask about your habits, behaviors, and lifestyle   Do an exam   Give you vaccines if needed   Talk to you about any medicines you take   Give advice about your health   Answer your questions  Getting regular check-ups is an important part of taking care of your health. It can help your doctor find and treat any problems you have. But it's also important for preventing health problems.  A routine physical is different from a \"sick visit.\" A sick visit is when you see a doctor because of a health concern or problem. Since physicals are scheduled ahead of time, you can think about what you want to ask the doctor.  How often should I get a physical? -- It depends on your age and health. In general, for people age 21 years and older:   If you are younger than 50 years, you might be able to get a physical every 3 years.   If you are 50 years or older, your doctor might recommend a physical every year.  If you have an ongoing health condition, like diabetes or high blood pressure, your doctor will probably want to see you more often.  What happens during a physical? -- In general, each visit will include:   Physical exam - The doctor or nurse will check your height, weight, heart rate, and blood pressure. They will also look at your eyes and ears. They will ask about how you are feeling and whether you have any symptoms that bother you.   Medicines - It's a good idea to bring a list of all the medicines you take to each doctor visit. Your doctor will talk to you about your medicines and answer any questions. Tell them if you are having any side effects that bother you. You " PT C/O PAIN 8/10. PO PAIN MEDS GIVEN AS ORDERED. ACE WRAP REMOVED TEDS
APPLIED. ENCOURAGED PT TO CONTINUE TO SIT IN RECLINER. EDUCATED  PT ON
IMPORTANCE OF KEEPING LEG STRAIGHT AND NOT RESTING WITH A BEND IN THE KNEE. PT
VERBALIZED UNDERSTANDING. "should also tell them if you are having trouble paying for any of your medicines.   Habits and behaviors - This includes:   Your diet   Your exercise habits   Whether you smoke, drink alcohol, or use drugs   Whether you are sexually active   Whether you feel safe at home  Your doctor will talk to you about things you can do to improve your health and lower your risk of health problems. They will also offer help and support. For example, if you want to quit smoking, they can give you advice and might prescribe medicines. If you want to improve your diet or get more physical activity, they can help you with this, too.   Lab tests, if needed - The tests you get will depend on your age and situation. For example, your doctor might want to check your:   Cholesterol   Blood sugar   Iron level   Vaccines - The recommended vaccines will depend on your age, health, and what vaccines you already had. Vaccines are very important because they can prevent certain serious or deadly infections.   Discussion of screening - \"Screening\" means checking for diseases or other health problems before they cause symptoms. Your doctor can recommend screening based on your age, risk, and preferences. This might include tests to check for:   Cancer, such as breast, prostate, cervical, ovarian, colorectal, prostate, lung, or skin cancer   Sexually transmitted infections, such as chlamydia and gonorrhea   Mental health conditions like depression and anxiety  Your doctor will talk to you about the different types of screening tests. They can help you decide which screenings to have. They can also explain what the results might mean.   Answering questions - The physical is a good time to ask the doctor or nurse questions about your health. If needed, they can refer you to other doctors or specialists, too.  Adults older than 65 years often need other care, too. As you get older, your doctor will talk to you about:   How to prevent falling at " home   Hearing or vision tests   Memory testing   How to take your medicines safely   Making sure that you have the help and support you need at home  All topics are updated as new evidence becomes available and our peer review process is complete.  This topic retrieved from [employer] on: May 02, 2024.  Topic 916237 Version 1.0  Release: 32.4.3 - C32.122  © 2024 UpToDate, Inc. and/or its affiliates. All rights reserved.  Consumer Information Use and Disclaimer   Disclaimer: This generalized information is a limited summary of diagnosis, treatment, and/or medication information. It is not meant to be comprehensive and should be used as a tool to help the user understand and/or assess potential diagnostic and treatment options. It does NOT include all information about conditions, treatments, medications, side effects, or risks that may apply to a specific patient. It is not intended to be medical advice or a substitute for the medical advice, diagnosis, or treatment of a health care provider based on the health care provider's examination and assessment of a patient's specific and unique circumstances. Patients must speak with a health care provider for complete information about their health, medical questions, and treatment options, including any risks or benefits regarding use of medications. This information does not endorse any treatments or medications as safe, effective, or approved for treating a specific patient. UpToDate, Inc. and its affiliates disclaim any warranty or liability relating to this information or the use thereof.The use of this information is governed by the Terms of Use, available at https://www.woltersRewardsForceuwer.com/en/know/clinical-effectiveness-terms. 2024© UpToDate, Inc. and its affiliates and/or licensors. All rights reserved.  Copyright   © 2024 Up[employer]Date, Inc. and/or its affiliates. All rights reserved.

## 2024-09-11 ENCOUNTER — OFFICE VISIT (OUTPATIENT)
Dept: PHYSICAL THERAPY | Facility: REHABILITATION | Age: 39
End: 2024-09-11
Payer: COMMERCIAL

## 2024-09-11 DIAGNOSIS — M25.562 ACUTE PAIN OF LEFT KNEE: Primary | ICD-10-CM

## 2024-09-11 DIAGNOSIS — M25.462 SWELLING OF LEFT KNEE JOINT: ICD-10-CM

## 2024-09-11 DIAGNOSIS — M22.2X2 PATELLOFEMORAL DISORDER OF LEFT KNEE: ICD-10-CM

## 2024-09-11 DIAGNOSIS — S83.242A OTHER TEAR OF MEDIAL MENISCUS OF LEFT KNEE AS CURRENT INJURY, INITIAL ENCOUNTER: ICD-10-CM

## 2024-09-11 PROCEDURE — 97112 NEUROMUSCULAR REEDUCATION: CPT

## 2024-09-11 PROCEDURE — 97530 THERAPEUTIC ACTIVITIES: CPT

## 2024-09-11 PROCEDURE — 97110 THERAPEUTIC EXERCISES: CPT

## 2024-09-17 ENCOUNTER — APPOINTMENT (OUTPATIENT)
Dept: PHYSICAL THERAPY | Facility: REHABILITATION | Age: 39
End: 2024-09-17
Payer: COMMERCIAL

## 2024-09-25 ENCOUNTER — APPOINTMENT (OUTPATIENT)
Dept: PHYSICAL THERAPY | Facility: REHABILITATION | Age: 39
End: 2024-09-25
Payer: COMMERCIAL

## 2024-10-01 NOTE — PROGRESS NOTES
Daily Note     Today's date: 10/2/2024  Patient name: Cande Heredia  : 1985  MRN: [id number]  Referring provider: Juaquin Frankel DO  Dx:   Encounter Diagnosis     ICD-10-CM    1. Acute pain of left knee  M25.562       2. Swelling of left knee joint  M25.462       3. Other tear of medial meniscus of left knee as current injury, initial encounter  S83.242A       4. Patellofemoral disorder of left knee  M22.2X2           Start Time: 1300  Stop Time: 1345  Total time in clinic (min): 45 minutes    Subjective: Pt reports steady progress, no increase in pain. Some days when she does too much it bothers her.       Objective: See treatment diary below      Assessment: Tolerated treatment well. Provided updated copy of HEP to aide with compliance with good understanding. Patient demonstrated fatigue post treatment, exhibited good technique with therapeutic exercises, and would benefit from continued PT      Plan: Continue per plan of care.      Precautions: not significant  *HEP: [id number]     Manuals 8/28 9/4 9/11 10/2   8/15  8/19  8/26   [facility]                                            Neuro Re-Ed              Pt education              Bridges 10# 3x10 10# 3x10 10# 3x10 10# 3x10  5# 3x10  8# 3x10  10# 3x10   Clamshells            SLS              HS stretch              Calf stretch           SLR  1# 3x10  1# 3x10 1# 3x10 1# 3x10   3x15  1# 3x8   1# 3x10   Heel slides              Ther Ex              SAQ 5# 2x10 ea 5# 2x10 ea 5# 2x10 ea 5# 2x10 ea   2.5# 2x10 ea  3# 2x10 ea  4# 2x10 ea   LAQ 5# 2x10 ea 5# 2x10 ea 5# 2x10 ea 5# 2x10 ea   2.5# 2x10 ea  3# 2x10 ea  4# 2x10 ea   HS curls (matrix) DL 75# 2x10 DL 75# 2x10 DL 75# 2x10 DL 75# 2x10   DL 55# 2x10  DL 60# 2x10   DL 65# 2x10   Standing HR 1 up 1 down 2x10 1 up 1 down 2x10 1 up 1 down 2x10 1 up 1 down 2x10    1 up 1 down 2x10 1 up 1 down 2x10   Bike 5' lvl 1 5' lvl 1 5' lvl 1 5' lvl 1   5' lvl 1  5' lvl 1   5' lvl 1                  Ther  "Activity              STS  10# 3x10 10# 3x10 10# 3x10 10# 3x10   5# 3x10  8# 3x10  10# 3x10   Side steps blue 3 laps blue 3 laps blue 3 laps blue 3 laps   grn 3 laps  blue 3 laps  blue 3 laps   Step ups (FW) 8\" 2x10 step up and overs nv 6\" 2x10 step up and overs 6\" 2x10 step up and overs   6\" 2x10 step up and overs  8\" 2x10 step up and overs  8\" 2x10 step up and overs   S/L leg press (position 5) 65# 2x10 ea 75# 2x10 ea 75# 2x10 ea 75# 2x10 ea  45# 2x10 ea  55# 2x10 ea 65# 2x10 ea   Tib raises              Modalities              cryotherapy                                                             "

## 2024-10-02 ENCOUNTER — OFFICE VISIT (OUTPATIENT)
Dept: PHYSICAL THERAPY | Facility: REHABILITATION | Age: 39
End: 2024-10-02
Payer: COMMERCIAL

## 2024-10-02 DIAGNOSIS — M22.2X2 PATELLOFEMORAL DISORDER OF LEFT KNEE: ICD-10-CM

## 2024-10-02 DIAGNOSIS — M25.562 ACUTE PAIN OF LEFT KNEE: Primary | ICD-10-CM

## 2024-10-02 DIAGNOSIS — S83.242A OTHER TEAR OF MEDIAL MENISCUS OF LEFT KNEE AS CURRENT INJURY, INITIAL ENCOUNTER: ICD-10-CM

## 2024-10-02 DIAGNOSIS — M25.462 SWELLING OF LEFT KNEE JOINT: ICD-10-CM

## 2024-10-02 PROCEDURE — 97530 THERAPEUTIC ACTIVITIES: CPT

## 2024-10-02 PROCEDURE — 97110 THERAPEUTIC EXERCISES: CPT

## 2024-10-02 PROCEDURE — 97112 NEUROMUSCULAR REEDUCATION: CPT

## 2024-10-19 ENCOUNTER — HOSPITAL ENCOUNTER (OUTPATIENT)
Facility: HOSPITAL | Age: 39
Discharge: HOME | End: 2024-10-19
Payer: COMMERCIAL

## 2024-10-19 DIAGNOSIS — Z12.31 ENCOUNTER FOR SCREENING MAMMOGRAM FOR MALIGNANT NEOPLASM OF BREAST: ICD-10-CM

## 2024-10-19 PROCEDURE — 77063 BREAST TOMOSYNTHESIS BI: CPT

## 2024-10-19 PROCEDURE — 77067 SCR MAMMO BI INCL CAD: CPT

## 2024-11-15 ENCOUNTER — TELEPHONE (OUTPATIENT)
Age: 39
End: 2024-11-15

## 2025-06-17 ENCOUNTER — HOSPITAL ENCOUNTER (OUTPATIENT)
Dept: RADIOLOGY | Facility: HOSPITAL | Age: 40
Discharge: HOME | End: 2025-06-17
Payer: COMMERCIAL

## 2025-06-17 ENCOUNTER — OFFICE VISIT (OUTPATIENT)
Age: 40
End: 2025-06-17
Payer: COMMERCIAL

## 2025-06-17 VITALS
HEART RATE: 75 BPM | WEIGHT: 215 LBS | TEMPERATURE: 97.5 F | DIASTOLIC BLOOD PRESSURE: 78 MMHG | BODY MASS INDEX: 34.7 KG/M2 | SYSTOLIC BLOOD PRESSURE: 122 MMHG | OXYGEN SATURATION: 98 %

## 2025-06-17 DIAGNOSIS — R20.2 PARESTHESIA AND PAIN OF RIGHT EXTREMITY: ICD-10-CM

## 2025-06-17 DIAGNOSIS — M79.609 PARESTHESIA AND PAIN OF RIGHT EXTREMITY: ICD-10-CM

## 2025-06-17 DIAGNOSIS — M25.511 ACUTE PAIN OF RIGHT SHOULDER: Primary | ICD-10-CM

## 2025-06-17 DIAGNOSIS — M25.511 ACUTE PAIN OF RIGHT SHOULDER: ICD-10-CM

## 2025-06-17 PROCEDURE — 99213 OFFICE O/P EST LOW 20 MIN: CPT

## 2025-06-17 PROCEDURE — 73030 X-RAY EXAM OF SHOULDER: CPT

## 2025-06-17 RX ORDER — NAPROXEN 500 MG/1
500 TABLET ORAL 2 TIMES DAILY WITH MEALS
Qty: 28 TABLET | Refills: 0 | Status: SHIPPED | OUTPATIENT
Start: 2025-06-17

## 2025-06-17 RX ORDER — METHYLPREDNISOLONE 4 MG/1
TABLET ORAL
Qty: 21 EACH | Refills: 0 | Status: SHIPPED | OUTPATIENT
Start: 2025-06-17

## 2025-06-17 NOTE — LETTER
June 17, 2025     Patient: Cande Heredia  YOB: 1985  Date of Visit: 6/17/2025      To Whom it May Concern:    Cande Heredia is under my professional care. Cande was seen in my office on 6/17/2025. Cande may return to work on 6/23/2025.    If you have any questions or concerns, please don't hesitate to call.         Sincerely,          Minerva Keating,         CC: No Recipients

## 2025-06-17 NOTE — PROGRESS NOTES
Name: Cande Heredia      : 1985      MRN: [id number]  Encounter Provider: Minerva eKating DO  Encounter Date: 2025   Encounter department: [facility]  :  Assessment & Plan  Acute pain of right shoulder  Patient presents with acute right shoulder pain and associated paresthesia of right upper arm without any reported recent trauma, injury, or fall. Pain is reproducible today in office with active ROM in all planes.  ROM limited in office due to pain in abduction, adduction, flexion, and extension.   Previously has taken 250 mg methocarbamol without improvement of symptoms.  At this time, have ordered Naproxen 500 mg BID x 14 days, Medrol 4 mg dose pack, topical Voltaren up to 4 times daily for pain control  XR ordered today to assess for possible fracture or bony deformity.  Ddx at this time inclusive of but not limited to muscle strain/tear vs cervical radiculopathy vs rotator cuff tendinopathy vs reactive arthritis   If XR unremarkable and pain uncontrolled, may consider MRI to assess rotator cuff and/or cervical spine  Orders:    naproxen (Naprosyn) 500 mg tablet; Take 1 tablet (500 mg total) by mouth 2 (two) times a day with meals    methylPREDNISolone 4 MG tablet therapy pack; Use as directed on package    Diclofenac Sodium (VOLTAREN) 1 %; Apply 2 g topically 4 (four) times a day    XR shoulder 2+ vw right; Future    Paresthesia and pain of right extremity  Cande presents to clinic today for evaluation of decreased sensation of the right upper extremity with associated pain of the right shoulder and upper arm.  Noted to primarily be in the dermatomal distribution of C5.  I have ordered scheduled Naproxen, Medrol dose pack, and PRN Voltaren for her to use, as well as XR to rule out any bony injury.  However, at this time I am not able to r/o cervical pathology as the cause of her symptoms.  Patient reports no history of trauma to the area.   Would consider MRI if  symptoms worsen or fail to improve with management.           Physical Activity Assessment and Intervention:    Activity journal reviewed      Emotional and Mental Well-being, Sleep, Connectedness Assessment and Intervention:    Sleep/stress assessment performed    Depression and anxiety screening performed and reviewed      Return if symptoms worsen or fail to improve.      Depression Screening and Follow-up Plan: Patient was screened for depression during today's encounter. They screened negative with a PHQ-2 score of 0.        History of Present Illness   Cande is a 40 y.o. female with PMHx significant for patellofemoral arthritis of the left knee who presents to clinic today for evaluation of acute right shoulder pain and paresthesia of the upper arm. She states the pain began as a dull pain in the right shoulder 4 days ago without any source of trauma, injury, infection, or strain. The pain had remained dull and insidious until this morning, when it escalated into excruciating 10/10 pain without any exacerbating event per patient. She states that the pain is constant but alternates between stabbing and dull and is worsened with turning her head to either side. The patient states that she has never had pain like this before in this location or of this magnitude.     Attempts to control the pain with BioFreeze spray, lidocaine patches, heating pads and 250 mg of Robaxin have all failed. Patient also reports feeling a loss of sensation in her upper anterior arm along the distribution from the long head of the biceps to the elbow. Patient denies loss of sensation in any other distribution, radiating paresthesias down her arm, electric shock sensations, recent febrile or GI illness, chest pain, shortness of breath, visual changes, or dizziness.      Review of Systems   Constitutional: Negative.    HENT: Negative.     Eyes: Negative.    Respiratory: Negative.     Cardiovascular: Negative.    Gastrointestinal:  Negative.    Endocrine: Negative.    Genitourinary: Negative.  Negative for difficulty urinating.   Musculoskeletal:  Positive for myalgias, neck pain and neck stiffness. Negative for back pain and gait problem.   Skin: Negative.    Allergic/Immunologic: Negative.    Neurological:  Positive for numbness (Numbness in upper arm over biceps). Negative for dizziness, facial asymmetry and headaches.   Hematological: Negative.    Psychiatric/Behavioral: Negative.         Objective   /78   Pulse 75   Temp 97.5 °F (36.4 °C)   Wt 97.5 kg (215 lb)   SpO2 98%   BMI 34.70 kg/m²      Physical Exam  Vitals reviewed.   Constitutional:       General: She is in acute distress (2/2 pain).      Appearance: Normal appearance. She is not ill-appearing or toxic-appearing.   HENT:      Head: Normocephalic and atraumatic.      Right Ear: External ear normal.      Left Ear: External ear normal.      Nose: Nose normal.     Eyes:      General:         Right eye: No discharge.         Left eye: No discharge.      Conjunctiva/sclera: Conjunctivae normal.     Neck:      Thyroid: No thyromegaly.      Vascular: No carotid bruit.     Cardiovascular:      Rate and Rhythm: Normal rate and regular rhythm.      Pulses: Normal pulses.      Heart sounds: Normal heart sounds.   Pulmonary:      Effort: Pulmonary effort is normal.      Breath sounds: Normal breath sounds.   Abdominal:      General: Abdomen is flat. There is no distension.      Palpations: Abdomen is soft.     Musculoskeletal:         General: Tenderness present. No signs of injury.      Right shoulder: Tenderness present. No swelling, deformity, effusion, laceration or crepitus. Decreased range of motion (2/2 pain). Normal pulse.      Left shoulder: Normal. No swelling, deformity, effusion, laceration, bony tenderness or crepitus. Normal range of motion. Normal strength. Normal pulse.      Right upper arm: Normal.      Left upper arm: Normal.      Right elbow: Normal.      Left  elbow: Normal.      Cervical back: Rigidity (2/2 pain) and tenderness present. No crepitus. Decreased range of motion (Limited by pain/stiffness).   Lymphadenopathy:      Cervical: No cervical adenopathy.     Skin:     General: Skin is warm and dry.      Capillary Refill: Capillary refill takes less than 2 seconds.     Neurological:      General: No focal deficit present.      Mental Status: She is alert and oriented to person, place, and time.     Psychiatric:         Mood and Affect: Mood normal. Affect is tearful (intermittently, with increase in pain by motion of arm).         Behavior: Behavior normal. Behavior is cooperative.         Thought Content: Thought content normal.         Judgment: Judgment normal.       Minerva Keating, DO

## 2025-06-18 ENCOUNTER — TELEPHONE (OUTPATIENT)
Age: 40
End: 2025-06-18

## 2025-06-18 NOTE — TELEPHONE ENCOUNTER
Pt calling in because she couldn't see her results for her xray yet through her portal; checked chart and advised her they are still in process.